# Patient Record
Sex: FEMALE | Race: WHITE | NOT HISPANIC OR LATINO | Employment: FULL TIME | URBAN - METROPOLITAN AREA
[De-identification: names, ages, dates, MRNs, and addresses within clinical notes are randomized per-mention and may not be internally consistent; named-entity substitution may affect disease eponyms.]

---

## 2017-01-13 ENCOUNTER — PRENATAL OFFICE VISIT - HEALTHEAST (OUTPATIENT)
Dept: FAMILY MEDICINE | Facility: CLINIC | Age: 32
End: 2017-01-13

## 2017-01-13 DIAGNOSIS — F17.200 TOBACCO USE DISORDER: ICD-10-CM

## 2017-01-13 DIAGNOSIS — Z34.82 ENCOUNTER FOR SUPERVISION OF OTHER NORMAL PREGNANCY IN SECOND TRIMESTER: ICD-10-CM

## 2017-02-08 ENCOUNTER — HOSPITAL ENCOUNTER (OUTPATIENT)
Dept: ULTRASOUND IMAGING | Facility: CLINIC | Age: 32
Discharge: HOME | End: 2017-02-08
Attending: FAMILY MEDICINE

## 2017-02-08 ENCOUNTER — PRENATAL OFFICE VISIT - HEALTHEAST (OUTPATIENT)
Dept: FAMILY MEDICINE | Facility: CLINIC | Age: 32
End: 2017-02-08

## 2017-02-08 DIAGNOSIS — Z34.82 ENCOUNTER FOR SUPERVISION OF OTHER NORMAL PREGNANCY IN SECOND TRIMESTER: ICD-10-CM

## 2017-02-08 DIAGNOSIS — I83.90 VARICOSE VEIN OF LEG: ICD-10-CM

## 2017-03-08 ENCOUNTER — PRENATAL OFFICE VISIT - HEALTHEAST (OUTPATIENT)
Dept: FAMILY MEDICINE | Facility: CLINIC | Age: 32
End: 2017-03-08

## 2017-03-08 DIAGNOSIS — Z34.82 ENCOUNTER FOR SUPERVISION OF OTHER NORMAL PREGNANCY IN SECOND TRIMESTER: ICD-10-CM

## 2017-03-16 ENCOUNTER — COMMUNICATION - HEALTHEAST (OUTPATIENT)
Dept: FAMILY MEDICINE | Facility: CLINIC | Age: 32
End: 2017-03-16

## 2017-03-22 ENCOUNTER — COMMUNICATION - HEALTHEAST (OUTPATIENT)
Dept: FAMILY MEDICINE | Facility: CLINIC | Age: 32
End: 2017-03-22

## 2017-03-24 ENCOUNTER — COMMUNICATION - HEALTHEAST (OUTPATIENT)
Dept: SCHEDULING | Facility: CLINIC | Age: 32
End: 2017-03-24

## 2017-03-24 ENCOUNTER — HOSPITAL ENCOUNTER (OUTPATIENT)
Dept: MEDSURG UNIT | Facility: CLINIC | Age: 32
Discharge: HOME | End: 2017-03-24
Attending: FAMILY MEDICINE | Admitting: FAMILY MEDICINE

## 2017-03-24 ASSESSMENT — MIFFLIN-ST. JEOR: SCORE: 1467.92

## 2017-04-03 ENCOUNTER — COMMUNICATION - HEALTHEAST (OUTPATIENT)
Dept: FAMILY MEDICINE | Facility: CLINIC | Age: 32
End: 2017-04-03

## 2017-04-07 ENCOUNTER — PRENATAL OFFICE VISIT - HEALTHEAST (OUTPATIENT)
Dept: FAMILY MEDICINE | Facility: CLINIC | Age: 32
End: 2017-04-07

## 2017-04-07 DIAGNOSIS — Z34.90 SUPERVISION OF NORMAL PREGNANCY: ICD-10-CM

## 2017-04-07 DIAGNOSIS — M25.532 LEFT WRIST PAIN: ICD-10-CM

## 2017-04-10 LAB — SYPHILIS RPR SCREEN - HISTORICAL: NORMAL

## 2017-04-11 ENCOUNTER — COMMUNICATION - HEALTHEAST (OUTPATIENT)
Dept: FAMILY MEDICINE | Facility: CLINIC | Age: 32
End: 2017-04-11

## 2017-04-19 ENCOUNTER — PRENATAL OFFICE VISIT - HEALTHEAST (OUTPATIENT)
Dept: FAMILY MEDICINE | Facility: CLINIC | Age: 32
End: 2017-04-19

## 2017-04-19 DIAGNOSIS — Z34.82 ENCOUNTER FOR SUPERVISION OF OTHER NORMAL PREGNANCY IN SECOND TRIMESTER: ICD-10-CM

## 2017-04-19 DIAGNOSIS — R05.9 COUGH: ICD-10-CM

## 2017-05-04 ENCOUNTER — PRENATAL OFFICE VISIT - HEALTHEAST (OUTPATIENT)
Dept: FAMILY MEDICINE | Facility: CLINIC | Age: 32
End: 2017-05-04

## 2017-05-04 DIAGNOSIS — Z34.82 ENCOUNTER FOR SUPERVISION OF OTHER NORMAL PREGNANCY IN SECOND TRIMESTER: ICD-10-CM

## 2017-05-18 ENCOUNTER — PRENATAL OFFICE VISIT - HEALTHEAST (OUTPATIENT)
Dept: FAMILY MEDICINE | Facility: CLINIC | Age: 32
End: 2017-05-18

## 2017-05-18 DIAGNOSIS — J30.2 SEASONAL ALLERGIES: ICD-10-CM

## 2017-05-18 DIAGNOSIS — Z34.82 ENCOUNTER FOR SUPERVISION OF OTHER NORMAL PREGNANCY IN SECOND TRIMESTER: ICD-10-CM

## 2017-05-19 ENCOUNTER — HOSPITAL ENCOUNTER (OUTPATIENT)
Dept: ULTRASOUND IMAGING | Facility: CLINIC | Age: 32
Discharge: HOME | End: 2017-05-19
Attending: FAMILY MEDICINE

## 2017-05-19 DIAGNOSIS — Z34.82 ENCOUNTER FOR SUPERVISION OF OTHER NORMAL PREGNANCY IN SECOND TRIMESTER: ICD-10-CM

## 2017-05-31 ENCOUNTER — COMMUNICATION - HEALTHEAST (OUTPATIENT)
Dept: SCHEDULING | Facility: CLINIC | Age: 32
End: 2017-05-31

## 2017-05-31 ENCOUNTER — HOSPITAL ENCOUNTER (OUTPATIENT)
Dept: MEDSURG UNIT | Facility: CLINIC | Age: 32
Discharge: HOME | End: 2017-05-31
Attending: FAMILY MEDICINE | Admitting: FAMILY MEDICINE

## 2017-05-31 ASSESSMENT — MIFFLIN-ST. JEOR: SCORE: 1508.74

## 2017-06-01 ENCOUNTER — PRENATAL OFFICE VISIT - HEALTHEAST (OUTPATIENT)
Dept: FAMILY MEDICINE | Facility: CLINIC | Age: 32
End: 2017-06-01

## 2017-06-01 DIAGNOSIS — Z34.90 SUPERVISION OF NORMAL PREGNANCY: ICD-10-CM

## 2017-06-09 ENCOUNTER — PRENATAL OFFICE VISIT - HEALTHEAST (OUTPATIENT)
Dept: FAMILY MEDICINE | Facility: CLINIC | Age: 32
End: 2017-06-09

## 2017-06-09 DIAGNOSIS — Z34.90 SUPERVISION OF NORMAL PREGNANCY: ICD-10-CM

## 2017-06-13 ENCOUNTER — COMMUNICATION - HEALTHEAST (OUTPATIENT)
Dept: FAMILY MEDICINE | Facility: CLINIC | Age: 32
End: 2017-06-13

## 2017-06-15 ENCOUNTER — HOSPITAL ENCOUNTER (OUTPATIENT)
Dept: ULTRASOUND IMAGING | Facility: CLINIC | Age: 32
Discharge: HOME | End: 2017-06-15
Attending: FAMILY MEDICINE

## 2017-06-15 ENCOUNTER — PRENATAL OFFICE VISIT - HEALTHEAST (OUTPATIENT)
Dept: FAMILY MEDICINE | Facility: CLINIC | Age: 32
End: 2017-06-15

## 2017-06-15 ENCOUNTER — COMMUNICATION - HEALTHEAST (OUTPATIENT)
Dept: FAMILY MEDICINE | Facility: CLINIC | Age: 32
End: 2017-06-15

## 2017-06-15 DIAGNOSIS — Z34.82 ENCOUNTER FOR SUPERVISION OF OTHER NORMAL PREGNANCY IN SECOND TRIMESTER: ICD-10-CM

## 2017-06-16 ENCOUNTER — COMMUNICATION - HEALTHEAST (OUTPATIENT)
Dept: FAMILY MEDICINE | Facility: CLINIC | Age: 32
End: 2017-06-16

## 2017-06-22 ENCOUNTER — PRENATAL OFFICE VISIT - HEALTHEAST (OUTPATIENT)
Dept: FAMILY MEDICINE | Facility: CLINIC | Age: 32
End: 2017-06-22

## 2017-06-22 DIAGNOSIS — Z34.82 ENCOUNTER FOR SUPERVISION OF OTHER NORMAL PREGNANCY IN SECOND TRIMESTER: ICD-10-CM

## 2017-06-29 ENCOUNTER — ANESTHESIA - HEALTHEAST (OUTPATIENT)
Dept: OBGYN | Facility: CLINIC | Age: 32
End: 2017-06-29

## 2017-06-30 ENCOUNTER — HOME CARE/HOSPICE - HEALTHEAST (OUTPATIENT)
Dept: HOME HEALTH SERVICES | Facility: HOME HEALTH | Age: 32
End: 2017-06-30

## 2017-07-03 ENCOUNTER — COMMUNICATION - HEALTHEAST (OUTPATIENT)
Dept: OBGYN | Facility: CLINIC | Age: 32
End: 2017-07-03

## 2017-07-07 ENCOUNTER — COMMUNICATION - HEALTHEAST (OUTPATIENT)
Dept: FAMILY MEDICINE | Facility: CLINIC | Age: 32
End: 2017-07-07

## 2017-08-28 ENCOUNTER — COMMUNICATION - HEALTHEAST (OUTPATIENT)
Dept: FAMILY MEDICINE | Facility: CLINIC | Age: 32
End: 2017-08-28

## 2017-09-17 ENCOUNTER — OFFICE VISIT - HEALTHEAST (OUTPATIENT)
Dept: FAMILY MEDICINE | Facility: CLINIC | Age: 32
End: 2017-09-17

## 2017-09-17 DIAGNOSIS — M79.671 RIGHT FOOT PAIN: ICD-10-CM

## 2017-10-13 ENCOUNTER — OFFICE VISIT - HEALTHEAST (OUTPATIENT)
Dept: FAMILY MEDICINE | Facility: CLINIC | Age: 32
End: 2017-10-13

## 2017-10-13 DIAGNOSIS — Z23 NEED FOR VACCINATION: ICD-10-CM

## 2017-10-13 DIAGNOSIS — J45.909 ASTHMA: ICD-10-CM

## 2017-10-13 DIAGNOSIS — M54.50 LOW BACK PAIN: ICD-10-CM

## 2017-10-13 DIAGNOSIS — Z30.9 CONTRACEPTION MANAGEMENT: ICD-10-CM

## 2017-10-13 ASSESSMENT — MIFFLIN-ST. JEOR: SCORE: 1421.42

## 2017-10-29 ENCOUNTER — OFFICE VISIT - HEALTHEAST (OUTPATIENT)
Dept: FAMILY MEDICINE | Facility: CLINIC | Age: 32
End: 2017-10-29

## 2017-10-29 DIAGNOSIS — S29.012A MUSCLE STRAIN OF RIGHT UPPER BACK, INITIAL ENCOUNTER: ICD-10-CM

## 2017-11-14 ENCOUNTER — HOSPITAL ENCOUNTER (OUTPATIENT)
Dept: MRI IMAGING | Facility: CLINIC | Age: 32
Discharge: HOME | End: 2017-11-14
Attending: FAMILY MEDICINE

## 2017-11-14 DIAGNOSIS — M54.50 LOW BACK PAIN: ICD-10-CM

## 2017-11-16 ENCOUNTER — COMMUNICATION - HEALTHEAST (OUTPATIENT)
Dept: FAMILY MEDICINE | Facility: CLINIC | Age: 32
End: 2017-11-16

## 2017-11-24 ENCOUNTER — HOSPITAL ENCOUNTER (OUTPATIENT)
Dept: PHYSICAL MEDICINE AND REHAB | Facility: CLINIC | Age: 32
Discharge: HOME | End: 2017-11-24
Attending: PHYSICIAN ASSISTANT

## 2017-11-24 DIAGNOSIS — M54.16 LUMBAR RADICULITIS: ICD-10-CM

## 2017-11-24 DIAGNOSIS — M51.26 LUMBAR DISC HERNIATION: ICD-10-CM

## 2017-12-08 ENCOUNTER — OFFICE VISIT - HEALTHEAST (OUTPATIENT)
Dept: NEUROSURGERY | Facility: CLINIC | Age: 32
End: 2017-12-08

## 2017-12-08 DIAGNOSIS — M51.16 LUMBAR DISC HERNIATION WITH RADICULOPATHY: ICD-10-CM

## 2017-12-08 DIAGNOSIS — M54.16 LUMBAR RADICULOPATHY: ICD-10-CM

## 2017-12-08 ASSESSMENT — MIFFLIN-ST. JEOR: SCORE: 1430.49

## 2017-12-12 ENCOUNTER — COMMUNICATION - HEALTHEAST (OUTPATIENT)
Dept: NEUROSURGERY | Facility: CLINIC | Age: 32
End: 2017-12-12

## 2017-12-13 ENCOUNTER — RECORDS - HEALTHEAST (OUTPATIENT)
Dept: ADMINISTRATIVE | Facility: OTHER | Age: 32
End: 2017-12-13

## 2018-04-15 ENCOUNTER — RECORDS - HEALTHEAST (OUTPATIENT)
Dept: ADMINISTRATIVE | Facility: OTHER | Age: 33
End: 2018-04-15

## 2018-04-26 ENCOUNTER — COMMUNICATION - HEALTHEAST (OUTPATIENT)
Dept: FAMILY MEDICINE | Facility: CLINIC | Age: 33
End: 2018-04-26

## 2018-04-30 ENCOUNTER — COMMUNICATION - HEALTHEAST (OUTPATIENT)
Dept: FAMILY MEDICINE | Facility: CLINIC | Age: 33
End: 2018-04-30

## 2018-05-24 ENCOUNTER — COMMUNICATION - HEALTHEAST (OUTPATIENT)
Dept: TELEHEALTH | Facility: CLINIC | Age: 33
End: 2018-05-24

## 2018-05-24 ENCOUNTER — COMMUNICATION - HEALTHEAST (OUTPATIENT)
Dept: HEALTH INFORMATION MANAGEMENT | Facility: CLINIC | Age: 33
End: 2018-05-24

## 2018-08-08 ENCOUNTER — COMMUNICATION - HEALTHEAST (OUTPATIENT)
Dept: FAMILY MEDICINE | Facility: CLINIC | Age: 33
End: 2018-08-08

## 2018-08-08 ENCOUNTER — RECORDS - HEALTHEAST (OUTPATIENT)
Dept: GENERAL RADIOLOGY | Facility: CLINIC | Age: 33
End: 2018-08-08

## 2018-08-08 ENCOUNTER — OFFICE VISIT - HEALTHEAST (OUTPATIENT)
Dept: FAMILY MEDICINE | Facility: CLINIC | Age: 33
End: 2018-08-08

## 2018-08-08 DIAGNOSIS — M25.531 RIGHT WRIST PAIN: ICD-10-CM

## 2018-08-08 DIAGNOSIS — M25.531 WRIST PAIN, RIGHT: ICD-10-CM

## 2018-08-08 DIAGNOSIS — M25.531 PAIN IN RIGHT WRIST: ICD-10-CM

## 2018-08-08 DIAGNOSIS — M54.6 RIGHT-SIDED THORACIC BACK PAIN: ICD-10-CM

## 2018-08-09 ENCOUNTER — HOSPITAL ENCOUNTER (OUTPATIENT)
Dept: MRI IMAGING | Facility: CLINIC | Age: 33
Discharge: HOME | End: 2018-08-09
Attending: FAMILY MEDICINE

## 2018-08-09 ENCOUNTER — COMMUNICATION - HEALTHEAST (OUTPATIENT)
Dept: FAMILY MEDICINE | Facility: CLINIC | Age: 33
End: 2018-08-09

## 2018-08-09 ENCOUNTER — RECORDS - HEALTHEAST (OUTPATIENT)
Dept: ADMINISTRATIVE | Facility: OTHER | Age: 33
End: 2018-08-09

## 2018-08-09 DIAGNOSIS — M54.6 RIGHT-SIDED THORACIC BACK PAIN: ICD-10-CM

## 2018-08-15 ENCOUNTER — RECORDS - HEALTHEAST (OUTPATIENT)
Dept: ADMINISTRATIVE | Facility: OTHER | Age: 33
End: 2018-08-15

## 2018-09-26 ENCOUNTER — COMMUNICATION - HEALTHEAST (OUTPATIENT)
Dept: FAMILY MEDICINE | Facility: CLINIC | Age: 33
End: 2018-09-26

## 2018-11-13 ENCOUNTER — OFFICE VISIT - HEALTHEAST (OUTPATIENT)
Dept: FAMILY MEDICINE | Facility: CLINIC | Age: 33
End: 2018-11-13

## 2018-11-13 ENCOUNTER — COMMUNICATION - HEALTHEAST (OUTPATIENT)
Dept: NEUROSURGERY | Facility: CLINIC | Age: 33
End: 2018-11-13

## 2018-11-13 DIAGNOSIS — R32 URINARY INCONTINENCE, UNSPECIFIED TYPE: ICD-10-CM

## 2018-11-13 DIAGNOSIS — M54.42 ACUTE LEFT-SIDED LOW BACK PAIN WITH LEFT-SIDED SCIATICA: ICD-10-CM

## 2018-11-14 ENCOUNTER — OFFICE VISIT - HEALTHEAST (OUTPATIENT)
Dept: NEUROSURGERY | Facility: CLINIC | Age: 33
End: 2018-11-14

## 2018-11-14 ENCOUNTER — HOSPITAL ENCOUNTER (OUTPATIENT)
Dept: RADIOLOGY | Facility: HOSPITAL | Age: 33
Discharge: HOME | End: 2018-11-14

## 2018-11-14 DIAGNOSIS — M54.16 LUMBAR RADICULOPATHY: ICD-10-CM

## 2018-11-14 DIAGNOSIS — M54.40 ACUTE BILATERAL LOW BACK PAIN WITH SCIATICA, SCIATICA LATERALITY UNSPECIFIED: ICD-10-CM

## 2018-11-14 ASSESSMENT — MIFFLIN-ST. JEOR: SCORE: 1413.94

## 2018-11-17 ENCOUNTER — COMMUNICATION - HEALTHEAST (OUTPATIENT)
Dept: FAMILY MEDICINE | Facility: CLINIC | Age: 33
End: 2018-11-17

## 2018-11-17 DIAGNOSIS — J45.909 ASTHMA: ICD-10-CM

## 2019-05-24 ENCOUNTER — RECORDS - HEALTHEAST (OUTPATIENT)
Dept: GENERAL RADIOLOGY | Facility: CLINIC | Age: 34
End: 2019-05-24

## 2019-05-24 ENCOUNTER — RECORDS - HEALTHEAST (OUTPATIENT)
Dept: ADMINISTRATIVE | Facility: OTHER | Age: 34
End: 2019-05-24

## 2019-05-24 ENCOUNTER — OFFICE VISIT - HEALTHEAST (OUTPATIENT)
Dept: FAMILY MEDICINE | Facility: CLINIC | Age: 34
End: 2019-05-24

## 2019-05-24 DIAGNOSIS — M79.671 RIGHT FOOT PAIN: ICD-10-CM

## 2019-05-24 DIAGNOSIS — E55.9 VITAMIN D DEFICIENCY: ICD-10-CM

## 2019-05-24 DIAGNOSIS — J45.909 ASTHMA: ICD-10-CM

## 2019-05-24 DIAGNOSIS — Z13.220 LIPID SCREENING: ICD-10-CM

## 2019-05-24 DIAGNOSIS — J30.2 SEASONAL ALLERGIES: ICD-10-CM

## 2019-05-24 DIAGNOSIS — M79.671 PAIN IN RIGHT FOOT: ICD-10-CM

## 2019-05-24 LAB
ALBUMIN SERPL-MCNC: 4.2 G/DL (ref 3.5–5)
ALP SERPL-CCNC: 47 U/L (ref 45–120)
ALT SERPL W P-5'-P-CCNC: 13 U/L (ref 0–45)
ANION GAP SERPL CALCULATED.3IONS-SCNC: 9 MMOL/L (ref 5–18)
AST SERPL W P-5'-P-CCNC: 16 U/L (ref 0–40)
BILIRUB SERPL-MCNC: 0.7 MG/DL (ref 0–1)
BUN SERPL-MCNC: 18 MG/DL (ref 8–22)
CALCIUM SERPL-MCNC: 10.3 MG/DL (ref 8.5–10.5)
CHLORIDE BLD-SCNC: 106 MMOL/L (ref 98–107)
CHOLEST SERPL-MCNC: 148 MG/DL
CO2 SERPL-SCNC: 25 MMOL/L (ref 22–31)
CREAT SERPL-MCNC: 0.83 MG/DL (ref 0.6–1.1)
FASTING STATUS PATIENT QL REPORTED: YES
GFR SERPL CREATININE-BSD FRML MDRD: >60 ML/MIN/1.73M2
GLUCOSE BLD-MCNC: 85 MG/DL (ref 70–125)
HDLC SERPL-MCNC: 72 MG/DL
LDLC SERPL CALC-MCNC: 67 MG/DL
POTASSIUM BLD-SCNC: 4.3 MMOL/L (ref 3.5–5)
PROT SERPL-MCNC: 7.1 G/DL (ref 6–8)
SODIUM SERPL-SCNC: 140 MMOL/L (ref 136–145)
TRIGL SERPL-MCNC: 44 MG/DL

## 2019-05-27 LAB
25(OH)D3 SERPL-MCNC: 15.4 NG/ML (ref 30–80)
25(OH)D3 SERPL-MCNC: 15.4 NG/ML (ref 30–80)

## 2019-05-28 ENCOUNTER — COMMUNICATION - HEALTHEAST (OUTPATIENT)
Dept: FAMILY MEDICINE | Facility: CLINIC | Age: 34
End: 2019-05-28

## 2019-05-29 ENCOUNTER — COMMUNICATION - HEALTHEAST (OUTPATIENT)
Dept: FAMILY MEDICINE | Facility: CLINIC | Age: 34
End: 2019-05-29

## 2019-05-29 DIAGNOSIS — J45.909 ASTHMA: ICD-10-CM

## 2019-06-21 ENCOUNTER — OFFICE VISIT - HEALTHEAST (OUTPATIENT)
Dept: FAMILY MEDICINE | Facility: CLINIC | Age: 34
End: 2019-06-21

## 2019-06-21 DIAGNOSIS — R10.11 RUQ ABDOMINAL PAIN: ICD-10-CM

## 2019-06-21 LAB
ALBUMIN SERPL-MCNC: 4.4 G/DL (ref 3.5–5)
ALP SERPL-CCNC: 48 U/L (ref 45–120)
ALT SERPL W P-5'-P-CCNC: 14 U/L (ref 0–45)
ANION GAP SERPL CALCULATED.3IONS-SCNC: 5 MMOL/L (ref 5–18)
AST SERPL W P-5'-P-CCNC: 16 U/L (ref 0–40)
BASOPHILS # BLD AUTO: 0 THOU/UL (ref 0–0.2)
BASOPHILS NFR BLD AUTO: 1 % (ref 0–2)
BILIRUB SERPL-MCNC: 0.6 MG/DL (ref 0–1)
BUN SERPL-MCNC: 19 MG/DL (ref 8–22)
CALCIUM SERPL-MCNC: 9.6 MG/DL (ref 8.5–10.5)
CHLORIDE BLD-SCNC: 106 MMOL/L (ref 98–107)
CO2 SERPL-SCNC: 30 MMOL/L (ref 22–31)
CREAT SERPL-MCNC: 0.87 MG/DL (ref 0.6–1.1)
EOSINOPHIL # BLD AUTO: 0.7 THOU/UL (ref 0–0.4)
EOSINOPHIL NFR BLD AUTO: 8 % (ref 0–6)
ERYTHROCYTE [DISTWIDTH] IN BLOOD BY AUTOMATED COUNT: 11.7 % (ref 11–14.5)
GFR SERPL CREATININE-BSD FRML MDRD: >60 ML/MIN/1.73M2
GLUCOSE BLD-MCNC: 78 MG/DL (ref 70–125)
HCG UR QL: NEGATIVE
HCT VFR BLD AUTO: 38.1 % (ref 35–47)
HGB BLD-MCNC: 12.9 G/DL (ref 12–16)
LIPASE SERPL-CCNC: 32 U/L (ref 0–52)
LYMPHOCYTES # BLD AUTO: 2.6 THOU/UL (ref 0.8–4.4)
LYMPHOCYTES NFR BLD AUTO: 29 % (ref 20–40)
MCH RBC QN AUTO: 30.5 PG (ref 27–34)
MCHC RBC AUTO-ENTMCNC: 33.8 G/DL (ref 32–36)
MCV RBC AUTO: 90 FL (ref 80–100)
MONOCYTES # BLD AUTO: 0.5 THOU/UL (ref 0–0.9)
MONOCYTES NFR BLD AUTO: 5 % (ref 2–10)
NEUTROPHILS # BLD AUTO: 5.2 THOU/UL (ref 2–7.7)
NEUTROPHILS NFR BLD AUTO: 57 % (ref 50–70)
PLATELET # BLD AUTO: 158 THOU/UL (ref 140–440)
PMV BLD AUTO: 7.8 FL (ref 7–10)
POTASSIUM BLD-SCNC: 4 MMOL/L (ref 3.5–5)
PROT SERPL-MCNC: 7.1 G/DL (ref 6–8)
RBC # BLD AUTO: 4.22 MILL/UL (ref 3.8–5.4)
SODIUM SERPL-SCNC: 141 MMOL/L (ref 136–145)
WBC: 9 THOU/UL (ref 4–11)

## 2019-10-29 ENCOUNTER — OFFICE VISIT - HEALTHEAST (OUTPATIENT)
Dept: FAMILY MEDICINE | Facility: CLINIC | Age: 34
End: 2019-10-29

## 2019-10-29 DIAGNOSIS — Z13.220 LIPID SCREENING: ICD-10-CM

## 2019-10-29 DIAGNOSIS — L29.9 PRURITUS: ICD-10-CM

## 2019-10-29 DIAGNOSIS — E55.9 VITAMIN D DEFICIENCY: ICD-10-CM

## 2019-10-29 DIAGNOSIS — Z00.00 ROUTINE GENERAL MEDICAL EXAMINATION AT A HEALTH CARE FACILITY: ICD-10-CM

## 2019-10-29 DIAGNOSIS — G89.29 CHRONIC NONINTRACTABLE HEADACHE, UNSPECIFIED HEADACHE TYPE: ICD-10-CM

## 2019-10-29 DIAGNOSIS — Z23 IMMUNIZATION DUE: ICD-10-CM

## 2019-10-29 DIAGNOSIS — R51.9 CHRONIC NONINTRACTABLE HEADACHE, UNSPECIFIED HEADACHE TYPE: ICD-10-CM

## 2019-10-29 DIAGNOSIS — R10.11 RUQ ABDOMINAL PAIN: ICD-10-CM

## 2019-10-29 LAB
ALBUMIN SERPL-MCNC: 4.4 G/DL (ref 3.5–5)
ALP SERPL-CCNC: 56 U/L (ref 45–120)
ALT SERPL W P-5'-P-CCNC: 11 U/L (ref 0–45)
ANION GAP SERPL CALCULATED.3IONS-SCNC: 8 MMOL/L (ref 5–18)
AST SERPL W P-5'-P-CCNC: 17 U/L (ref 0–40)
BASOPHILS # BLD AUTO: 0 THOU/UL (ref 0–0.2)
BASOPHILS NFR BLD AUTO: 1 % (ref 0–2)
BILIRUB SERPL-MCNC: 0.7 MG/DL (ref 0–1)
BUN SERPL-MCNC: 20 MG/DL (ref 8–22)
CALCIUM SERPL-MCNC: 10.1 MG/DL (ref 8.5–10.5)
CHLORIDE BLD-SCNC: 104 MMOL/L (ref 98–107)
CHOLEST SERPL-MCNC: 161 MG/DL
CO2 SERPL-SCNC: 27 MMOL/L (ref 22–31)
CREAT SERPL-MCNC: 0.84 MG/DL (ref 0.6–1.1)
EOSINOPHIL # BLD AUTO: 0.4 THOU/UL (ref 0–0.4)
EOSINOPHIL NFR BLD AUTO: 5 % (ref 0–6)
ERYTHROCYTE [DISTWIDTH] IN BLOOD BY AUTOMATED COUNT: 13.5 % (ref 11–14.5)
ERYTHROCYTE [SEDIMENTATION RATE] IN BLOOD BY WESTERGREN METHOD: 3 MM/HR (ref 0–20)
FASTING STATUS PATIENT QL REPORTED: NORMAL
GFR SERPL CREATININE-BSD FRML MDRD: >60 ML/MIN/1.73M2
GLUCOSE BLD-MCNC: 88 MG/DL (ref 70–125)
HBA1C MFR BLD: 5 % (ref 3.5–6)
HCT VFR BLD AUTO: 39.5 % (ref 35–47)
HDLC SERPL-MCNC: 83 MG/DL
HGB BLD-MCNC: 13.4 G/DL (ref 12–16)
LDLC SERPL CALC-MCNC: 68 MG/DL
LYMPHOCYTES # BLD AUTO: 2.2 THOU/UL (ref 0.8–4.4)
LYMPHOCYTES NFR BLD AUTO: 24 % (ref 20–40)
MCH RBC QN AUTO: 30.3 PG (ref 27–34)
MCHC RBC AUTO-ENTMCNC: 34 G/DL (ref 32–36)
MCV RBC AUTO: 89 FL (ref 80–100)
MONOCYTES # BLD AUTO: 0.5 THOU/UL (ref 0–0.9)
MONOCYTES NFR BLD AUTO: 5 % (ref 2–10)
NEUTROPHILS # BLD AUTO: 5.7 THOU/UL (ref 2–7.7)
NEUTROPHILS NFR BLD AUTO: 65 % (ref 50–70)
PLATELET # BLD AUTO: 178 THOU/UL (ref 140–440)
PMV BLD AUTO: 7.9 FL (ref 7–10)
POTASSIUM BLD-SCNC: 4.6 MMOL/L (ref 3.5–5)
PROT SERPL-MCNC: 7.4 G/DL (ref 6–8)
RBC # BLD AUTO: 4.43 MILL/UL (ref 3.8–5.4)
SODIUM SERPL-SCNC: 139 MMOL/L (ref 136–145)
TRIGL SERPL-MCNC: 51 MG/DL
TSH SERPL DL<=0.005 MIU/L-ACNC: 0.68 UIU/ML (ref 0.3–5)
WBC: 8.8 THOU/UL (ref 4–11)

## 2019-10-29 RX ORDER — TRIAMCINOLONE ACETONIDE 1 MG/G
OINTMENT TOPICAL
Qty: 30 G | Refills: 2 | Status: SHIPPED | OUTPATIENT
Start: 2019-10-29 | End: 2022-03-02

## 2019-10-29 ASSESSMENT — MIFFLIN-ST. JEOR: SCORE: 1441.61

## 2019-10-30 ENCOUNTER — COMMUNICATION - HEALTHEAST (OUTPATIENT)
Dept: FAMILY MEDICINE | Facility: CLINIC | Age: 34
End: 2019-10-30

## 2019-10-30 LAB
25(OH)D3 SERPL-MCNC: 42 NG/ML (ref 30–80)
25(OH)D3 SERPL-MCNC: 42 NG/ML (ref 30–80)

## 2019-11-05 ENCOUNTER — HOSPITAL ENCOUNTER (OUTPATIENT)
Dept: ULTRASOUND IMAGING | Facility: CLINIC | Age: 34
Discharge: HOME | End: 2019-11-05
Attending: FAMILY MEDICINE

## 2019-11-05 DIAGNOSIS — R10.11 RUQ ABDOMINAL PAIN: ICD-10-CM

## 2019-11-06 ENCOUNTER — COMMUNICATION - HEALTHEAST (OUTPATIENT)
Dept: FAMILY MEDICINE | Facility: CLINIC | Age: 34
End: 2019-11-06

## 2019-11-06 DIAGNOSIS — R10.11 RUQ ABDOMINAL PAIN: ICD-10-CM

## 2019-11-07 RX ORDER — OMEPRAZOLE 40 MG/1
40 CAPSULE, DELAYED RELEASE ORAL DAILY
Qty: 30 CAPSULE | Refills: 1 | Status: SHIPPED | OUTPATIENT
Start: 2019-11-07 | End: 2022-03-02

## 2020-01-02 ENCOUNTER — COMMUNICATION - HEALTHEAST (OUTPATIENT)
Dept: FAMILY MEDICINE | Facility: CLINIC | Age: 35
End: 2020-01-02

## 2020-01-02 DIAGNOSIS — J45.909 ASTHMA: ICD-10-CM

## 2020-01-06 ENCOUNTER — COMMUNICATION - HEALTHEAST (OUTPATIENT)
Dept: FAMILY MEDICINE | Facility: CLINIC | Age: 35
End: 2020-01-06

## 2020-01-06 DIAGNOSIS — G89.29 CHRONIC NONINTRACTABLE HEADACHE, UNSPECIFIED HEADACHE TYPE: ICD-10-CM

## 2020-01-06 DIAGNOSIS — R51.9 CHRONIC NONINTRACTABLE HEADACHE, UNSPECIFIED HEADACHE TYPE: ICD-10-CM

## 2020-01-07 ENCOUNTER — HOSPITAL ENCOUNTER (OUTPATIENT)
Dept: CT IMAGING | Facility: CLINIC | Age: 35
Discharge: HOME | End: 2020-01-07
Attending: FAMILY MEDICINE

## 2020-01-07 DIAGNOSIS — R51.9 CHRONIC NONINTRACTABLE HEADACHE, UNSPECIFIED HEADACHE TYPE: ICD-10-CM

## 2020-01-07 DIAGNOSIS — G89.29 CHRONIC NONINTRACTABLE HEADACHE, UNSPECIFIED HEADACHE TYPE: ICD-10-CM

## 2020-01-22 ENCOUNTER — RECORDS - HEALTHEAST (OUTPATIENT)
Dept: ADMINISTRATIVE | Facility: OTHER | Age: 35
End: 2020-01-22

## 2020-03-23 ENCOUNTER — COMMUNICATION - HEALTHEAST (OUTPATIENT)
Dept: FAMILY MEDICINE | Facility: CLINIC | Age: 35
End: 2020-03-23

## 2020-03-24 ENCOUNTER — OFFICE VISIT - HEALTHEAST (OUTPATIENT)
Dept: FAMILY MEDICINE | Facility: CLINIC | Age: 35
End: 2020-03-24

## 2020-03-24 ENCOUNTER — COMMUNICATION - HEALTHEAST (OUTPATIENT)
Dept: FAMILY MEDICINE | Facility: CLINIC | Age: 35
End: 2020-03-24

## 2020-03-24 DIAGNOSIS — J45.31 MILD PERSISTENT ASTHMA WITH ACUTE EXACERBATION: ICD-10-CM

## 2020-03-24 DIAGNOSIS — J45.909 ASTHMA: ICD-10-CM

## 2020-03-24 DIAGNOSIS — B37.0 THRUSH: ICD-10-CM

## 2020-03-24 RX ORDER — ALBUTEROL SULFATE 0.83 MG/ML
2.5 SOLUTION RESPIRATORY (INHALATION) EVERY 4 HOURS PRN
Qty: 30 VIAL | Refills: 0 | Status: SHIPPED | OUTPATIENT
Start: 2020-03-24 | End: 2022-03-02

## 2020-04-28 ENCOUNTER — COMMUNICATION - HEALTHEAST (OUTPATIENT)
Dept: FAMILY MEDICINE | Facility: CLINIC | Age: 35
End: 2020-04-28

## 2020-04-28 DIAGNOSIS — J45.31 MILD PERSISTENT ASTHMA WITH ACUTE EXACERBATION: ICD-10-CM

## 2020-08-10 ENCOUNTER — COMMUNICATION - HEALTHEAST (OUTPATIENT)
Dept: FAMILY MEDICINE | Facility: CLINIC | Age: 35
End: 2020-08-10

## 2020-08-10 DIAGNOSIS — J45.909 ASTHMA: ICD-10-CM

## 2020-10-13 ENCOUNTER — COMMUNICATION - HEALTHEAST (OUTPATIENT)
Dept: FAMILY MEDICINE | Facility: CLINIC | Age: 35
End: 2020-10-13

## 2020-10-13 DIAGNOSIS — J45.909 ASTHMA: ICD-10-CM

## 2020-10-17 RX ORDER — ALBUTEROL SULFATE 90 UG/1
AEROSOL, METERED RESPIRATORY (INHALATION)
Qty: 34 G | Refills: 2 | Status: SHIPPED | OUTPATIENT
Start: 2020-10-17 | End: 2021-11-11

## 2020-11-21 ENCOUNTER — OFFICE VISIT - HEALTHEAST (OUTPATIENT)
Dept: FAMILY MEDICINE | Facility: CLINIC | Age: 35
End: 2020-11-21

## 2020-11-21 ENCOUNTER — RECORDS - HEALTHEAST (OUTPATIENT)
Dept: ADMINISTRATIVE | Facility: OTHER | Age: 35
End: 2020-11-21

## 2020-11-21 DIAGNOSIS — M77.11 LATERAL EPICONDYLITIS OF RIGHT ELBOW: ICD-10-CM

## 2020-11-21 DIAGNOSIS — M25.512 ACUTE PAIN OF LEFT SHOULDER: ICD-10-CM

## 2020-12-26 ENCOUNTER — OFFICE VISIT - HEALTHEAST (OUTPATIENT)
Dept: FAMILY MEDICINE | Facility: CLINIC | Age: 35
End: 2020-12-26

## 2020-12-26 DIAGNOSIS — L03.012 PARONYCHIA OF LEFT LITTLE FINGER: ICD-10-CM

## 2020-12-26 ASSESSMENT — MIFFLIN-ST. JEOR: SCORE: 1483.34

## 2021-03-19 ENCOUNTER — RECORDS - HEALTHEAST (OUTPATIENT)
Dept: ADMINISTRATIVE | Facility: OTHER | Age: 36
End: 2021-03-19

## 2021-04-30 ENCOUNTER — AMBULATORY - HEALTHEAST (OUTPATIENT)
Dept: NURSING | Facility: CLINIC | Age: 36
End: 2021-04-30

## 2021-05-21 ENCOUNTER — AMBULATORY - HEALTHEAST (OUTPATIENT)
Dept: NURSING | Facility: CLINIC | Age: 36
End: 2021-05-21

## 2021-05-26 ENCOUNTER — RECORDS - HEALTHEAST (OUTPATIENT)
Dept: ADMINISTRATIVE | Facility: CLINIC | Age: 36
End: 2021-05-26

## 2021-05-27 ENCOUNTER — RECORDS - HEALTHEAST (OUTPATIENT)
Dept: ADMINISTRATIVE | Facility: CLINIC | Age: 36
End: 2021-05-27

## 2021-05-28 ENCOUNTER — RECORDS - HEALTHEAST (OUTPATIENT)
Dept: ADMINISTRATIVE | Facility: CLINIC | Age: 36
End: 2021-05-28

## 2021-05-29 NOTE — TELEPHONE ENCOUNTER
RN cannot approve Refill Request    RN can NOT refill this medication med is not covered by policy/route to provider     . Last office visit: 5/24/2019 Farheen Robertson MD Last Physical: Visit date not found Last MTM visit: Visit date not found Last visit same specialty: 5/24/2019 Farheen Robertson MD.  Next visit within 3 mo: Visit date not found  Next physical within 3 mo: Visit date not found      Noemi Mondragon, [facility] [place] Connection Triage/Med Refill 5/29/2019    Requested Prescriptions   Pending Prescriptions Disp Refills     ADVAIR DISKUS 100-50 mcg/dose DISKUS [Pharmacy Med Name: ADVAIR DISKUS 60'S 100/50] 180 each 0     Sig: USE 1 INHALATION TWICE A DAY       There is no refill protocol information for this order

## 2021-05-29 NOTE — PROGRESS NOTES
ASSESSMENT/PLAN:       1. Right foot pain  -Given her history of prior stress fracture and her exam and description of her symptoms I am suspicious for recurrent stress fracture.  As she has been limiting her mobility already for the last several weeks without pain relief I am putting her in a walking boot.  She will wear this for the next 2 to 3 weeks and plan on following up with me at that time.  If her symptoms are persistent we will proceed with MRI, if they are improving we can consider repeat x-ray versus gradual return to activity.  Updating her labs as well as listed below given her prior stress fractures and we may need to consider bone density scanning.  - XR Foot Right 3 or More VWS; Future    2. Vitamin D deficiency  -Has had very low vitamin D in the past, updating her levels again today given her foot pain.  - Comprehensive Metabolic Panel  - Vitamin D, Total (25-Hydroxy)    3. Seasonal allergies  -Exam is most consistent with seasonal allergies although could consider approving sinusitis as well as her children have been sick.  Starting her back on Flonase for now, and she can let me know if things are not improving over the next few weeks and we could consider treating for sinusitis.  - fluticasone propionate (FLONASE) 50 mcg/actuation nasal spray; 2 sprays into each nostril daily.  Dispense: 16 g; Refill: 11    4. Asthma  -Generally doing well with her breathing, asthma control test is normal today.  Renewing her Pro Air for as needed usage and plan on following up in 6 to 12 months.  - albuterol (PROAIR HFA;PROVENTIL HFA;VENTOLIN HFA) 90 mcg/actuation inhaler; Inhale 2 puffs every 6 (six) hours as needed for wheezing.  Dispense: 3 Inhaler; Refill: 3    5. Lipid screening  - Lipid Cascade      Return in about 2 weeks (around 6/7/2019) for recheck.      Farheen Robertson MD      PROGRESS NOTE   5/24/2019    SUBJECTIVE:  Estela Ramirez is a 33 y.o. female  who presents for foot pain.     She  comes in today with foot pain. It has been there for the last few weeks. It is getting worse. The pain is increasing. She did move recently. The other night she was stretching her feet and stretching her toes and lifting them and that made things worse.     She has had a stuffy nose as well. She is not coughing. She has had some headaches, but no sore throat. Her appetite is fine. She has had some mild left ear pain. No sinus pain. She is taking her allergy medication. Her daughter was sick last month and they are sick again and hers has lingered.   Chief Complaint   Patient presents with     Foot Pain     Patient has been having right foot pain for a few weeks, recently the pain has gotten worse. There is no swelling. No injury.      Nasal Congestion     Patient has had nasal congestion for a month. No fevers.          Patient Active Problem List   Diagnosis     Asthma     Lower Back Pain     Nicotine Dependence     Vitamin D Deficiency     MTHFR mutation (H)     History of superficial phlebitis     Varicose vein of leg     Lumbar radiculopathy       Current Outpatient Medications   Medication Sig Dispense Refill     acetaminophen (TYLENOL) 325 MG tablet Take 650 mg by mouth every 6 (six) hours as needed for pain.       ADVAIR DISKUS 100-50 mcg/dose DISKUS USE 1 INHALATION TWICE A DAY 3 each 0     albuterol (PROAIR HFA;PROVENTIL HFA;VENTOLIN HFA) 90 mcg/actuation inhaler Inhale 2 puffs every 6 (six) hours as needed for wheezing. 3 Inhaler 3     cetirizine (ZYRTEC) 10 MG tablet Take 10 mg by mouth daily.       fluticasone propionate (FLONASE) 50 mcg/actuation nasal spray 2 sprays into each nostril daily. 16 g 11     No current facility-administered medications for this visit.        Social History     Tobacco Use   Smoking Status Current Some Day Smoker     Packs/day: 0.25     Types: Cigarettes   Smokeless Tobacco Never Used           OBJECTIVE:        Recent Results (from the past 240 hour(s))   Comprehensive  Metabolic Panel   Result Value Ref Range    Sodium 140 136 - 145 mmol/L    Potassium 4.3 3.5 - 5.0 mmol/L    Chloride 106 98 - 107 mmol/L    CO2 25 22 - 31 mmol/L    Anion Gap, Calculation 9 5 - 18 mmol/L    Glucose 85 70 - 125 mg/dL    BUN 18 8 - 22 mg/dL    Creatinine 0.83 0.60 - 1.10 mg/dL    GFR MDRD Af Amer >60 >60 mL/min/1.73m2    GFR MDRD Non Af Amer >60 >60 mL/min/1.73m2    Bilirubin, Total 0.7 0.0 - 1.0 mg/dL    Calcium 10.3 8.5 - 10.5 mg/dL    Protein, Total 7.1 6.0 - 8.0 g/dL    Albumin 4.2 3.5 - 5.0 g/dL    Alkaline Phosphatase 47 45 - 120 U/L    AST 16 0 - 40 U/L    ALT 13 0 - 45 U/L   Lipid Cascade   Result Value Ref Range    Cholesterol 148 <=199 mg/dL    Triglycerides 44 <=149 mg/dL    HDL Cholesterol 72 >=50 mg/dL    LDL Calculated 67 <=129 mg/dL    Patient Fasting > 8hrs? Yes        Vitals:    05/24/19 0824   BP: 102/62   Patient Site: Left Arm   Patient Position: Sitting   Cuff Size: Adult Regular   Pulse: 90   Temp: 97.8  F (36.6  C)   TempSrc: Oral   SpO2: 99%   Weight: 149 lb 11.2 oz (67.9 kg)     Weight: 149 lb 11.2 oz (67.9 kg)          Physical Exam:  GENERAL APPEARANCE: A&A, NAD, well hydrated, well nourished  SKIN:  Normal skin turgor, no lesions/rashes   HEENT: moist mucous membranes, no rhinorrhea, pharynx shows some mild postnasal drip, tympanic membranes are clear bilaterally  NECK: Normal, without lymphadenopathy  CV: RRR, no M/G/R   LUNGS: CTAB  EXTREMITY: no edema, right foot with tenderness to palpation over the second and third metatarsals over the plantar aspect of the foot without any other obvious abnormalities or tenderness to palpation  NEURO: no gross deficits     Xr Foot Right 3 Or More Vws    Result Date: 5/24/2019  EXAM: XR FOOT RIGHT 3 OR MORE VWS LOCATION: [facility] DATE/TIME: 5/24/2019 8:50 AM INDICATION: Right foot pain over midfoot for several weeks COMPARISON: 09/17/2017. FINDINGS: Negative foot. No fracture or dislocation. No change from 09/17/2017.

## 2021-05-30 ENCOUNTER — RECORDS - HEALTHEAST (OUTPATIENT)
Dept: ADMINISTRATIVE | Facility: CLINIC | Age: 36
End: 2021-05-30

## 2021-05-30 VITALS — WEIGHT: 167.8 LBS | BODY MASS INDEX: 26.28 KG/M2

## 2021-05-30 VITALS — WEIGHT: 159.4 LBS | BODY MASS INDEX: 24.97 KG/M2

## 2021-05-30 VITALS — WEIGHT: 166.7 LBS | BODY MASS INDEX: 26.11 KG/M2

## 2021-05-30 VITALS — BODY MASS INDEX: 25.72 KG/M2 | WEIGHT: 164.2 LBS

## 2021-05-30 VITALS — HEIGHT: 67 IN | WEIGHT: 161 LBS | BODY MASS INDEX: 25.27 KG/M2

## 2021-05-30 VITALS — WEIGHT: 161.7 LBS | BODY MASS INDEX: 25.33 KG/M2

## 2021-05-30 VITALS — WEIGHT: 154 LBS | BODY MASS INDEX: 24.12 KG/M2

## 2021-05-31 VITALS — BODY MASS INDEX: 23.36 KG/M2 | WEIGHT: 151.38 LBS

## 2021-05-31 VITALS — BODY MASS INDEX: 26.52 KG/M2 | WEIGHT: 169.3 LBS

## 2021-05-31 VITALS — BODY MASS INDEX: 26.63 KG/M2 | WEIGHT: 170 LBS

## 2021-05-31 VITALS — WEIGHT: 151 LBS | HEIGHT: 68 IN | BODY MASS INDEX: 22.88 KG/M2

## 2021-05-31 VITALS — BODY MASS INDEX: 26.64 KG/M2 | WEIGHT: 170.1 LBS

## 2021-05-31 VITALS — HEIGHT: 67 IN | BODY MASS INDEX: 26.68 KG/M2 | WEIGHT: 170 LBS

## 2021-05-31 VITALS — WEIGHT: 149 LBS | BODY MASS INDEX: 22.58 KG/M2 | HEIGHT: 68 IN

## 2021-05-31 VITALS — BODY MASS INDEX: 26.72 KG/M2 | WEIGHT: 170.6 LBS

## 2021-05-31 VITALS — BODY MASS INDEX: 27.82 KG/M2 | WEIGHT: 177.6 LBS

## 2021-05-31 VITALS — BODY MASS INDEX: 23.34 KG/M2 | WEIGHT: 149 LBS

## 2021-06-01 ENCOUNTER — RECORDS - HEALTHEAST (OUTPATIENT)
Dept: ADMINISTRATIVE | Facility: CLINIC | Age: 36
End: 2021-06-01

## 2021-06-01 VITALS — BODY MASS INDEX: 22.61 KG/M2 | WEIGHT: 146.5 LBS

## 2021-06-02 ENCOUNTER — RECORDS - HEALTHEAST (OUTPATIENT)
Dept: ADMINISTRATIVE | Facility: CLINIC | Age: 36
End: 2021-06-02

## 2021-06-02 VITALS — BODY MASS INDEX: 23.15 KG/M2 | WEIGHT: 150 LBS

## 2021-06-02 VITALS — BODY MASS INDEX: 23.4 KG/M2 | WEIGHT: 149.1 LBS | HEIGHT: 67 IN

## 2021-06-02 NOTE — PROGRESS NOTES
Assessment:         1. Routine general medical examination at a health care facility    2. Chronic nonintractable headache, unspecified headache type    3. RUQ abdominal pain    4. Pruritus    5. Vitamin D deficiency    6. Lipid screening    7. Immunization due        Plan:      1. Routine general medical examination at a health care facility  -Routine health maintenance discussion:  No smoking, limited alcohol (7 or less servings per week), 5 fruits/veg servings per day, 200 minutes of exercise per week.  Daily calcium/vitamin D guidelines, bone health, colon cancer screening beginning at age 50.  Accident avoidance, sun screen.     2. Chronic nonintractable headache, unspecified headache type  -Discussed that her symptoms seem most consistent with either tension headache or medication overuse headache.  Unfortunately she does need ibuprofen for her back issues, but I am going to start her on a course of prednisone to see if this helps with her headache for now.  Additionally, I am going to have her proceed with a CT scan of the head as well as make sure there is no obvious intracranial abnormality.  If things are not improving she will let me know, if they are getting better with the prednisone we can continue this for a short course of time.  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  She will follow-up in 3 to 4 months if things are not improving, sooner if they are worsening.  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  - CT Head Without Contrast; Future  - predniSONE (DELTASONE) 10 mg tablet; Take 4 tabs daily for 3 days, then 3 tabs daily for 3 days, then 2 tabs daily for 3 days, then 1 tab daily for 3 days, then stop..  Dispense:  12 tablet; Refill: 0    3. RUQ abdominal pain  -Concerning for possible gallbladder issues given her fertility and recent pregnancy.  Labs thankfully have been normal, ultrasound of the abdomen ordered for further evaluation.  If this is unremarkable we could consider further evaluation and possible treatment for gastritis given her  chronic ibuprofen usage and smoking.  - Comprehensive Metabolic Panel  - US Abdomen Limited; Future    4. Pruritus  -Unsure etiology but I suspect is related to eczema.  I am going to check labs as listed below to look for other obvious exacerbations, going to have her try triamcinolone ointment in the areas that are flared and she will follow-up if things are not improving at which point I could refer her to dermatology for further evaluation and assessment.  - HM1(CBC and Differential)  - Erythrocyte Sedimentation Rate  - Thyroid Cascade  - Glycosylated Hemoglobin A1c  - HM1 (CBC with Diff)  - triamcinolone (KENALOG) 0.1 % ointment; Apply small amount to affected 2-3 times daily until gone  Dispense: 30 g; Refill: 2    5. Vitamin D deficiency  -Updating levels today adjust medication as needed  - Vitamin D, Total (25-Hydroxy)    6. Lipid screening  - Lipid Cascade    7. Immunization due  - Influenza,Seasonal,Quad,INJ =/>6months         Subjective:      Estela Ramirez is a 33 y.o. female who presents for an annual exam. The patient is sexually active. The patient participates in regular exercise: no. The patient reports that there is not domestic violence in her life.     She has had some headaches since July. She did go to [place] last year in July, quit taking birth control and they went away. Switched to [place], then started getting headaches again and quit taking her allergy pill. It feels like it is all over but it feels like it is in her forehead, and feels like pressure. Days when sheis not at work she still has headaches. She does take ibuprofen, did try aleve as well and  that doesn't help. It's not excruciating, is constantly there. She does take ibuprofen daily due to her back and other issues. Her brother did just get , her  is in [place], left 10/20 and comes home on 10/31. Another brother is sick with cancer. Just bought a house. She does probably need her eyes checked again, does probably need a stronger prescription but no real vision changes.     She does have some pain over the distal knuckle on the right third finger. If she hits it or touches it or with constant bending it does hurt. She can bend it, but doesn't know why it hurts. She has no known injury.     She does have worse hip pain as well, it gest worse going from sitting to standing and when she is laying on either side. There is a point tender spot on there.     She did go to walk in clinic. She does have abdominal pain on the right. The walk in clinic did blood work and it was normal. The pain will come and go. She does have normal stooling. She didn't note that there is any change with greasy foods. It is under the ribs on the right. No nausea. Her stools are softer.     She does itch everywhere, especially her palms, head and legs. She does have a history of eczema, she does have some hydrocortisone and was using this on her antecubital fossae but her palms and head she doesn't know what to do.     Breathing is fine, advair is working well for her. Her back has been ok as well.     Healthy Habits:   Regular Exercise: No  Sunscreen Use: No  Healthy Diet: No  Dental Visits Regularly: Yes  Seat Belt: Yes  Sexually active: Yes  Self Breast Exam Monthly:No  Hemoccults: N/A  Flex Sig: N/A  Colonoscopy: N/A  Lipid Profile: Yes  Glucose Screen: Yes  Prevention of Osteoporosis: No  Last Dexa: N/A  Guns at Home:  No      Immunization History   Administered Date(s) Administered     DT (pediatric) 10/11/2000     Hep A, historic 02/09/2009, 02/26/2010     Hep B, historic 10/11/2000, 01/17/2001, 09/20/2006      Influenza,seasonal quad, PF, =/> 6months 12/15/2016     Influenza,seasonal,quad inj =/> 6months 10/13/2017     Td,adult,historic,unspecified 2008     Tdap 2013, 2017     Immunization status: up to date and documented, missing doses of flu.    No exam data present    Gynecologic History  Patient's last menstrual period was 10/12/2019 (exact date).  Contraception: none  Last Pap: 10/13/17. Results were: normal  Last mammogram: N/A. Results were: N/A      OB History    Para Term  AB Living   2 1 1     1   SAB TAB Ectopic Multiple Live Births         0 1      # Outcome Date GA Lbr Oswald/2nd Weight Sex Delivery Anes PTL Lv   2 Term 17 40w2d 04:03 / 01:15 7 lb 5.8 oz (3.34 kg) F Vag-Spont EPI N BELEM   1                 Current Outpatient Medications   Medication Sig Dispense Refill     acetaminophen (TYLENOL) 325 MG tablet Take 650 mg by mouth every 6 (six) hours as needed for pain.       ADVAIR DISKUS 100-50 mcg/dose DISKUS USE 1 INHALATION TWICE A  each 1     albuterol (PROAIR HFA;PROVENTIL HFA;VENTOLIN HFA) 90 mcg/actuation inhaler Inhale 2 puffs every 6 (six) hours as needed for wheezing. 3 Inhaler 3     fluticasone propionate (FLONASE) 50 mcg/actuation nasal spray 2 sprays into each nostril daily. 16 g 11     cetirizine (ZYRTEC) 10 MG tablet Take 10 mg by mouth daily.       No current facility-administered medications for this visit.      Past Medical History:   Diagnosis Date     Asthma      MTHFR mutation (H)      Past Surgical History:   Procedure Laterality Date     HEMILAMINOTOMY LUMBAR SPINE Left 10/14/2003    L4-5     Cephalosporins and Other allergy (see comments)  Family History   Problem Relation Age of Onset     Varicose Veins Mother      Pancreatic cancer Brother      Pancreatic cancer Maternal Grandmother      Throat cancer Maternal Grandfather      Social History     Socioeconomic History     Marital status:      Spouse name: Not on file     Number  "of children: Not on file     Years of education: Not on file     Highest education level: Not on file   Occupational History     Not on file   Social Needs     Financial resource strain: Not on file     Food insecurity:     Worry: Not on file     Inability: Not on file     Transportation needs:     Medical: Not on file     Non-medical: Not on file   Tobacco Use     Smoking status: Current Some Day Smoker     Packs/day: 0.25     Types: Cigarettes     Smokeless tobacco: Never Used   Substance and Sexual Activity     Alcohol use: No     Drug use: No     Sexual activity: Yes     Partners: Male   Lifestyle     Physical activity:     Days per week: Not on file     Minutes per session: Not on file     Stress: Not on file   Relationships     Social connections:     Talks on phone: Not on file     Gets together: Not on file     Attends Spiritism service: Not on file     Active member of club or organization: Not on file     Attends meetings of clubs or organizations: Not on file     Relationship status: Not on file     Intimate partner violence:     Fear of current or ex partner: Not on file     Emotionally abused: Not on file     Physically abused: Not on file     Forced sexual activity: Not on file   Other Topics Concern     Not on file   Social History Narrative     Not on file       Review of Systems  Review of Systems      With the exception of the aforementioned issues, 12 point, comprehensive ROS was done and was negative.     Objective:         Vitals:    10/29/19 0925   BP: 116/64   Pulse: 90   Temp: 98  F (36.7  C)   TempSrc: Oral   SpO2: 98%   Weight: 155 lb 3.2 oz (70.4 kg)   Height: 5' 7\" (1.702 m)     Body mass index is 24.31 kg/m .    Physical  Physical Exam     Gen: Well developed, well nourished, no acute distress.  HEENT: normocephalic/atraumatic, PERRLA/EOMI, TMs: Gray, normal light reflex, no nasal discharge.  Oral mucosa: no erythema/exudate  Neck: No LAD/masses/thyromegaly  Lungs: clear " bilaterally  Heart: regular rate and rhythm, no murmurs/gallops/rubs  Breasts: symmetric, no masses/skin changes, nipple discharge, or axillary LAD.  BSE reviewed.  Abdomen: Normal bowel sounds, soft, non-tender, non-distended, no masses, neg Alston's/McBurney's, no rebound/guarding  Genital: deferred, no complaints  Lymphatics: no supraclavicular/axillary/epitrochlear/inguinal LAD. No edema.  Neuro: A&O x 3, CN II-XII intact, strength 5/5, reflexes symmetric, sensory intact to light touch.  Psych: Behavior appropriate, engaging.  Thought processes congruent, non-tangential.  Musculoskeletal: no gross deformities.  Skin: no rashes or lesions.

## 2021-06-02 NOTE — TELEPHONE ENCOUNTER
Medication Question or Clarification  Who is calling: Pharmacy: [facility]  What medication are you calling about? (include dose and sig)   predniSONE (DELTASONE) 10 mg tablet 12 tablet 0 10/29/2019     Sig: Take 4 tabs daily for 3 days, then 3 tabs daily for 3 days, then 2 tabs daily for 3 days, then 1 tab daily for 3 days, then stop..    Sent to pharmacy as: predniSONE 10 mg tablet (DELTASONE)    E-Prescribing Status: Receipt confirmed by pharmacy (10/29/2019 10:23 AM CDT)        Who prescribed the medication?: Farheen Robertson MD  What is your question/concern?: the directions do not match the dispense amount.   Pharmacy: [facility]  Okay to leave a detailed message?: Yes  Site CMT - Please call the pharmacy to obtain any additional needed information.

## 2021-06-03 VITALS
DIASTOLIC BLOOD PRESSURE: 64 MMHG | OXYGEN SATURATION: 98 % | HEART RATE: 90 BPM | BODY MASS INDEX: 24.36 KG/M2 | HEIGHT: 67 IN | TEMPERATURE: 98 F | SYSTOLIC BLOOD PRESSURE: 116 MMHG | WEIGHT: 155.2 LBS

## 2021-06-03 VITALS — BODY MASS INDEX: 23.45 KG/M2 | WEIGHT: 149.7 LBS

## 2021-06-03 VITALS — BODY MASS INDEX: 23.34 KG/M2 | WEIGHT: 149 LBS

## 2021-06-05 ENCOUNTER — RECORDS - HEALTHEAST (OUTPATIENT)
Dept: VASCULAR SURGERY | Facility: CLINIC | Age: 36
End: 2021-06-05

## 2021-06-05 VITALS
HEIGHT: 67 IN | SYSTOLIC BLOOD PRESSURE: 127 MMHG | BODY MASS INDEX: 25.8 KG/M2 | OXYGEN SATURATION: 99 % | WEIGHT: 164.4 LBS | DIASTOLIC BLOOD PRESSURE: 72 MMHG | HEART RATE: 82 BPM

## 2021-06-05 VITALS
WEIGHT: 164 LBS | DIASTOLIC BLOOD PRESSURE: 76 MMHG | HEART RATE: 70 BPM | RESPIRATION RATE: 16 BRPM | OXYGEN SATURATION: 100 % | SYSTOLIC BLOOD PRESSURE: 124 MMHG | TEMPERATURE: 97.7 F | BODY MASS INDEX: 25.69 KG/M2

## 2021-06-05 DIAGNOSIS — I83.90 ASYMPTOMATIC VARICOSE VEINS: ICD-10-CM

## 2021-06-07 NOTE — TELEPHONE ENCOUNTER
RN cannot approve Refill Request    RN can NOT refill this medication med is not covered by policy/route to provider     . Last office visit: 5/24/2019 Farheen Robertson MD Last Physical: 10/29/2019 Last MTM visit: Visit date not found Last visit same specialty: 5/24/2019 Farheen Robertson MD.  Next visit within 3 mo: Visit date not found  Next physical within 3 mo: Visit date not found      Noemi Mondragon, [facility] Connection Triage/Med Refill 3/25/2020    Requested Prescriptions   Pending Prescriptions Disp Refills     WIXELA INHUB 100-50 mcg/dose DISKUS [Pharmacy Med Name: WIXELA  100MCG 50MCG  INH  INHUB] 180 each 1     Sig: USE 1 INHALATION BY MOUTH  TWICE A DAY       There is no refill protocol information for this order

## 2021-06-07 NOTE — PROGRESS NOTES
"Estela Ramirez is a 34 y.o. female who is being evaluated via a billable telephone visit.      The patient has been notified of following:     \"This telephone visit will be conducted via a call between you and your physician/provider. We have found that certain health care needs can be provided without the need for a physical exam.  This service lets us provide the care you need with a short phone conversation.  If a prescription is necessary we can send it directly to your pharmacy.  If lab work is needed we can place an order for that and you can then stop by our lab to have the test done at a later time.    If during the course of the call the physician/provider feels a telephone visit is not appropriate, you will not be charged for this service.\"         Estela Ramirez complains of    Chief Complaint   Patient presents with     Thrush     Pt c/o cotton feel in back of tounge and throat, she thinks it is from her Advair inhaler. x one week     Cough     Pt c/o phlegmy cough x 2 months, she denies fever and pain. She is requesting prednisone.       I have reviewed and updated the patient's Past Medical History, Social History, Family History and Medication List.    ALLERGIES  Cephalosporins and Other allergy (see comments)    Additional provider notes: See below    Assessment/Plan:    1. Mild persistent asthma with acute exacerbation     - predniSONE (DELTASONE) 20 MG tablet; Take 40 mg by mouth daily for 5 days.  Dispense: 10 tablet; Refill: 0  - albuterol (PROVENTIL) 2.5 mg /3 mL (0.083 %) nebulizer solution; Take 3 mL (2.5 mg total) by nebulization every 4 (four) hours as needed for wheezing or shortness of breath.  Dispense: 30 vial; Refill: 0    2. Thrush     - nystatin (MYCOSTATIN) 100,000 unit/mL suspension; Take 5 mL (500,000 Units total) by mouth 4 (four) times a day for 10 days.  Dispense: 200 mL; Refill: 0    Reinforced to the patient how she should be rinsing her mouth out after each use of the Advair and " even when she is brushing her teeth to make sure that she brushes her tongue and even the sides of the mouth would be good to gently brush.  Make sure she is drinking good amounts of fluids and protecting herself from other individuals.    Follow-up as needed      Phone call duration:  8 minutes    Sheri Loera MD    Telephone visit due to COVID-19 pandemic  Patient has a history of persistent asthma and has a flare of her asthma.  She has had a cough for 2 months with some sputum production.  She is noticed some wheezing and shortness of breath and has been using her Advair 1 puff twice a day.  She has developed symptoms consistent with thrush with a coating on her tongue and back of the throat.  She rinses her mouth out after using Advair.  She is requesting a refill of albuterol solution for neb treatments and is also requesting a prednisone refill.  She was prescribed prednisone in October 2019 for a persistent headache but did not use it at that time or even get it filled.    The patient does not have a fever and other family members are doing well.    .Jonnathan Loera MD

## 2021-06-07 NOTE — TELEPHONE ENCOUNTER
Orders being requested:   1. Neb Tubing  2. Mouth piece  3. Medication chamber    Reason service is needed/diagnosis: Asthma    When are orders needed by: ASAP    [facility]: [id number] [place], [place] - [address] AT [facility] [address]     Okay to leave detailed message?  Yes    Please send script to patients pharmacy and inform the patient of the outcome to this request.

## 2021-06-07 NOTE — TELEPHONE ENCOUNTER
Patient is aware. She has already spoken with the pharmacy and was told they had the supplies in stock.

## 2021-06-07 NOTE — TELEPHONE ENCOUNTER
rx sent, this is not something the pharmacy always carries, so she should call ahead before she goes in to pick it up.

## 2021-06-08 NOTE — PROGRESS NOTES
SUBJECTIVE:  Estela Ramirez is a 31 y.o. female.  She does have the heterozygous gene for MTHFR. She is doing well. She is not feeling any obvious baby movement. Sometimes she does think taht she feels a bit here and there. Her pants are tight as well. She has had no headaches, blurry vision or RUQ pain. She did have heartburn last night.   Her breathing is fine.     Chief Complaint   Patient presents with     Routine Prenatal Visit       OBJECTIVE:   Visit Vitals     /60     Pulse 68     Wt 154 lb (69.9 kg)     LMP 2016 (Exact Date)     BMI 24.12 kg/m2      Please see prenatal data for fundal height and fetal HR.     ASSESSMENT/PLAN:   Estela Ramirez is a 31 y.o. female  who presents for prenatal check. Patient is doing well.   F/u in four weeks, US ordered.   Will consider some behavior modification for her smoking to see if that will help.

## 2021-06-08 NOTE — PROGRESS NOTES
SUBJECTIVE:  Estela Ramirez is a 31 y.o. female.  She is doing pretty well. She is wondering if she can get a prescription for compression stockings. She has a varicose vein in her upper thigh on the right.     Baby is moving, but light. Some itching on her chest with signs of excoriation.  No longer buying cigarettes. 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   Chief Complaint   Patient presents with     Routine Prenatal Visit     20 week prenatal care.        OBJECTIVE:   Visit Vitals     /56 (Patient Site: Right Arm, Patient Position: Sitting, Cuff Size: Adult Regular)     Wt 159 lb 6.4 oz (72.3 kg)     LMP 2016 (Exact Date)     Breastfeeding No     BMI 24.97 kg/m2      Please see prenatal data for fundal height and fetal HR.     Excoriation on her chest, upper breasts. No rash  ASSESSMENT/PLAN:   Estela Ramirez is a 31 y.o. female  who presents for prenatal check. Patient is doing well.   Prescription for support hose supplied for the patient.   Start using the desonide and try a new size bra to see if that helps with the discomfort. Take bra off when she gets home.   F/u in four weeks.   No longer buying cigarettes,

## 2021-06-09 NOTE — PROGRESS NOTES
SUBJECTIVE:  Estela Ramirez is a 31 y.o. female.  She does have a cold, stuffy nose. Her son has been sick with a cold. Her breathing is ok for now. She has had no fevers, her asthma is doing fine for now as well, not needing her inhaler more than usual.     Baby is moving. She has had no blurry vision, ruq pain. She has had some headaches off and on with her congestion.   Chief Complaint   Patient presents with     Routine Prenatal Visit     24 week prenatal care.        OBJECTIVE:   Visit Vitals     /66 (Patient Site: Left Arm, Patient Position: Sitting, Cuff Size: Adult Regular)     Wt 161 lb 11.2 oz (73.3 kg)     LMP 2016 (Exact Date)     Breastfeeding No     BMI 25.33 kg/m2      Please see prenatal data for fundal height and fetal HR.     ASSESSMENT/PLAN:   Estela Ramirez is a 31 y.o. female  who presents for prenatal check. Patient is doing well  She does have a cold right now but a normal exam. For now I would recommend supportive cares with tylenol as needed, nasal saline as well as honey for the cough. Use inhaler as needed and f/u next week if things aren't getting better.   Baby is doing well, one hour glucose next time.

## 2021-06-09 NOTE — PROGRESS NOTES
SUBJECTIVE:  Estela Ramirez is a 31 y.o. female.  Her left wrist has been bothering her. It is achy and she feels like she can't hold a box. She thought that she had a wrist brace for it, but its her right wrist. She had De quer on that side. She has had ganglion cysts on this side in the past and thought that she did but doesn't. She has no tingling in her fingers on that side. The pain is over the wrist itself. There has been no injury. She is sure it is from lifting things.     Baby is moving is moving well. No blurry vision, headaches, ruq pain. Minimal swelling in her feet at the end of the day. Achy everywhere.   Chief Complaint   Patient presents with     Routine Prenatal Visit     28 week prenatal care.        OBJECTIVE:   /60 (Patient Site: Right Arm, Patient Position: Sitting, Cuff Size: Adult Regular)  Wt 164 lb 3.2 oz (74.5 kg)  LMP 2016 (Exact Date)  Breastfeeding? No  BMI 25.72 kg/m2   Please see prenatal data for fundal height and fetal HR.     Exam shows positive Finkelstein's test today on the left wrist    ASSESSMENT/PLAN:   Estela Ramirez is a 31 y.o. female  who presents for prenatal check. Patient is doing well.   We discussed trying a wrist brace today for the wrist and she will let me know if it doesn't help and we can consider further intervention. One hour glucose today as well as RPR, hemoglobin and Tdap  Discussed cetirizine as well for allergies.

## 2021-06-09 NOTE — PROGRESS NOTES
1545 pt arrived to [facility] to be evaluated for abdominal cramping. Telephone orders rec'vd for wet, prep, UA and NST. Pt declines need to urinate at this time.[place] and US monitors applied, OB OP admission navigator completed. Wet prep and UA collected and sent to lab for evaluation.   Spoke with Dr. Robertson, results reviewed from wet prep and UA. would like cervical length and right ovary US.    1834 page placed to Dr. Robertson to review US results. Return call rec'vd. Results of US reviewed. OK to d/c pt to home.   1845  Pt agreeable with plan. D/C instructions reviewed with pt. Questions answered.   1852 pt d/c to home ambulatory and undelivered.   Ellen Franco RN

## 2021-06-10 NOTE — PROGRESS NOTES
SUBJECTIVE:  Estela Ramirez is a 31 y.o. female.  She is generally doing ok. She is coughing again. It is still productive. She is breathing ok. She does have seasonal allergies, she is taking claritin. She does have itchy eyes, nose and sneezing which is does help with. She is using her inhalers.     She has no headaches, blurry vision, ruq pain. She has no swelling. Baby is moving well.     Chief Complaint   Patient presents with     Routine Prenatal Visit     34 wk ob       OBJECTIVE:   /72  Pulse 78  Wt 170 lb (77.1 kg)  LMP 2016 (Exact Date)  BMI 26.63 kg/m2   Please see prenatal data for fundal height and fetal HR.     CV: RRR, nl s1, s2, no murmurs  Lungs: Clear bilaterally, pharynx with minimal signs of post nasal drip.     ASSESSMENT/PLAN:   Estela Ramirez is a 31 y.o. female  who presents for prenatal check. Patient is doing well.   Due to her measuring small for dates will update a growth US, she is otherwise doing well though and will f/u in two weeks.   Lastly, she will try some Afrin to see if this helps with her cough, if it does then we can have her trial some flonase to see if that works for her symptoms. If not she will let me know.

## 2021-06-10 NOTE — PROGRESS NOTES
SUBJECTIVE:  Estela Ramirez is a 31 y.o. female.  Cough is still productive although her breathing is better. Baby is moving well. She has had no headaches, blurry vision ruq pain. No swelling. Work is ok. She has had some [place] contractions, otherwise none.   Chief Complaint   Patient presents with     Routine Prenatal Visit     32 weeks prenatal care.        OBJECTIVE:   BP 92/64 (Patient Site: Left Arm, Patient Position: Sitting, Cuff Size: Adult Regular)  Wt 167 lb 12.8 oz (76.1 kg)  LMP 2016 (Exact Date)  Breastfeeding? No  BMI 26.28 kg/m2   Please see prenatal data for fundal height and fetal HR.     ASSESSMENT/PLAN:   Estela Ramirez is a 31 y.o. female  who presents for prenatal check. Patient is doing well.   Breathing has improved, for now follow and let me know if things worsen again.   If still measuring small next time will order an US for growth

## 2021-06-10 NOTE — PROGRESS NOTES
SUBJECTIVE:  Estela Ramirez is a 31 y.o. female.  Baby is moving well. She has had no headaches, blurry vision, ruq pain. Minimal swelling.     She started with allergies about 3 weeks ago, and then it turned into a stuffy nose and now has been coughing. It is productive at times. She feels quite short of breath and is using her inhaler quite a bit. She has no fevers. Appetite is down as well, can't eat too much.   Chief Complaint   Patient presents with     Routine Prenatal Visit     30 week prenatal care. Patient states she feels short of breath or winded lately due to a productive cough for three weeks.      [headaches, vision changes, edema, abdominal pain, cramping, BH] [fluid leaking, discharge]    OBJECTIVE:   /62 (Patient Site: Left Arm, Patient Position: Sitting, Cuff Size: Adult Regular)  Pulse 92  Temp 98.6  F (37  C) (Oral)   Wt 166 lb 11.2 oz (75.6 kg)  LMP 2016 (Exact Date)  SpO2 100%  Breastfeeding? No  BMI 26.11 kg/m2   Please see prenatal data for fundal height and fetal HR.     Head is Normocephalic atraumatic tympanic membrane's are clear bilaterally.  Heart is regular in rate and rhythm.  Lungs are clear to auscultation bilaterally without wheezing or crackles.    ASSESSMENT/PLAN:   Estela Ramirez is a 31 y.o. female  who presents for prenatal check. Patient is doing well.   Try azithromycin for now, will call if not improving and will send in a short prednisone taper.

## 2021-06-10 NOTE — TELEPHONE ENCOUNTER
RN cannot approve Refill Request    RN can NOT refill this medication Protocol failed and NO refill given. Last office visit: Visit date not found Last Physical: Visit date not found Last MTM visit: Visit date not found Last visit same specialty: 5/24/2019 Farheen Robertson MD.  Next visit within 3 mo: Visit date not found  Next physical within 3 mo: Visit date not found      Sadaf Burton, [facility] Triage/Med Refill 8/11/2020    Requested Prescriptions   Pending Prescriptions Disp Refills     WIXELA INHUB 100-50 mcg/dose DISKUS [Pharmacy Med Name: WIXELA  100MCG 50MCG  INH  INHUB] 180 each 3     Sig: USE 1 INHALATION BY MOUTH  TWICE DAILY       There is no refill protocol information for this order

## 2021-06-11 NOTE — ANESTHESIA POSTPROCEDURE EVALUATION
Patient: Estela Ramirez  * No procedures listed *  Anesthesia type: epidural    Patient location: PACU  Last vitals:   Vitals:    06/29/17 0915   BP: 93/52   Pulse: 66   Resp:    Temp:    SpO2:      Post vital signs: stable  Level of consciousness: awake and responds to simple questions  Post-anesthesia pain: pain controlled  Post-anesthesia nausea and vomiting: no  Pulmonary: unassisted, return to baseline  Cardiovascular: stable and blood pressure at baseline  Hydration: adequate  Anesthetic events: no    QCDR Measures:  ASA# 11 - Jennifer-op Cardiac Arrest: ASA11B - Patient did NOT experience unanticipated cardiac arrest  ASA# 12 - Jennifer-op Mortality Rate: ASA12B - Patient did NOT die  ASA# 13 - PACU Re-Intubation Rate: ASA13B - Patient did NOT require a new airway mgmt  ASA# 10 - Composite Anes Safety: ASA10A - No serious adverse event  ASA# 38 - New Corneal Injury: ASA38A - No new exposure keratitis or corneal abrasion in PACU    Additional Notes:

## 2021-06-11 NOTE — ANESTHESIA PROCEDURE NOTES
Epidural Block    Patient location during procedure: OB  Time Called: 6/29/2017 4:05 AM  Reason for Block:labor epidural  Staffing:  Performing  Anesthesiologist: GABY MTZ  Preanesthetic Checklist  Completed: patient identified, risks, benefits, and alternatives discussed, timeout performed, consent obtained, at patient's request, airway assessed, oxygen available, suction available, emergency drugs available and hand hygiene performed  Procedure  Patient position: sitting  Prep: ChloraPrep  Patient monitoring: continuous pulse oximetry  Approach: midline  Location: L3-L4  Injection technique: DANIELLE saline  Number of Attempts:1  Needle  Needle type: Michael   Needle gauge: 18 G     Catheter in Space: 2  Assessment  Sensory level:  No complications

## 2021-06-11 NOTE — PROGRESS NOTES
SUBJECTIVE:  Estela Ramirez is a 31 y.o. female.  She is overall doing well. She does have some increased pain in her legs and feet. She did have a sharp pain at times on her left side, comes and goes. Doesn't last very long. Seems to be a minutes or two. She was trying to get her son in the car and the other day it was when she was sitting there.     She is having no headaches, blurry vision, ruq pain. Minimal swelling at times. Baby is moving well.   Chief Complaint   Patient presents with     Routine Prenatal Visit     38 weeks prenatal care.        OBJECTIVE:   /68 (Patient Site: Left Arm, Patient Position: Sitting, Cuff Size: Adult Regular)  Wt 169 lb 4.8 oz (76.8 kg)  LMP 2016 (Exact Date)  Breastfeeding? No  BMI 26.52 kg/m2   Please see prenatal data for fundal height and fetal HR.     ASSESSMENT/PLAN:   Estela Ramirez is a 31 y.o. female  who presents for prenatal check. Patient is doing well.   Because she is measuring small I did order a BPP today  UA is normal otherwise. F/u in one week

## 2021-06-11 NOTE — PROGRESS NOTES
SUBJECTIVE:  Estela Ramirez is a 31 y.o. female.  She is getting more uncomfortable. She does have some cramping and does have increasingly intense BH contractions. Baby is moving ok. She has had no headaches, blurry vision, ruq pain or swelling.   Chief Complaint   Patient presents with     Routine Prenatal Visit     39 weeks prenatal care.          OBJECTIVE:   /62 (Patient Site: Left Arm, Patient Position: Sitting, Cuff Size: Adult Regular)  Wt 177 lb 9.6 oz (80.6 kg)  LMP 2016 (Exact Date)  Breastfeeding? No  BMI 27.82 kg/m2   Please see prenatal data for fundal height and fetal HR.     ASSESSMENT/PLAN:   Estela Ramirez is a 31 y.o. female  who presents for prenatal check. Patient is doing well.   F/u next week if no baby and can discuss IOL at that time.

## 2021-06-11 NOTE — PROGRESS NOTES
"Pt reports that after arriving at work this AM she voided.  Upon return to her desk she had another \"leak of fluid\" that was clear to white in color with mucus and saturated approx. 2x4in of her underwear.  Pt denies any continuous leaking of fluid, cramps, or vaginal irritation.  Wet prep and ROMplus collected and sent to lab. Alie Richards    "

## 2021-06-11 NOTE — PROGRESS NOTES
Pt's labs WNL/negative.  Paged provider.  Once updated, anticipate discharge to home. Alie Richards

## 2021-06-11 NOTE — PROGRESS NOTES
SUBJECTIVE:  Estela Ramirez is a 31 y.o. female.  She is doing ok. Some increasing leg soreness and swelling. Baby does feel low as well. She has had some rosita rivera but no other contractions. She has had no headaces, blurry vision or ruq pain.     Chief Complaint   Patient presents with     Routine Prenatal Visit     37 week prenatal care.          OBJECTIVE:   /68 (Patient Site: Right Arm, Patient Position: Sitting, Cuff Size: Adult Regular)  Wt 170 lb 9.6 oz (77.4 kg)  LMP 2016 (Exact Date)  Breastfeeding? No  BMI 26.72 kg/m2   Please see prenatal data for fundal height and fetal HR.     ASSESSMENT/PLAN:   Estela Ramirez is a 31 y.o. female  who presents for prenatal check. Patient is doing well.   F/u in one week.

## 2021-06-11 NOTE — PROGRESS NOTES
SUBJECTIVE:  Estela Ramirez is a 31 y.o. female.  Trip to  yesterday, no ROM. Baby is low. Some [place] contractions, uncomfortable. No swelling. She has had some headaches, mainly at night. No blurry vision, ruq pain. Baby is moving, maybe somewhat diminished.     Her  is leaving on - for active duty for the [employer].     Chief Complaint   Patient presents with     Routine Prenatal Visit     36 weeks prental care.          OBJECTIVE:   /68 (Patient Site: Right Arm, Patient Position: Sitting, Cuff Size: Adult Regular)  Wt 170 lb 1.6 oz (77.2 kg)  LMP 2016 (Exact Date)  Breastfeeding? No  BMI 26.64 kg/m2   Please see prenatal data for fundal height and fetal HR.     ASSESSMENT/PLAN:   Estela Ramirez is a 31 y.o. female  who presents for prenatal check. Patient is doing well.   She will f/u next week, GBS today.

## 2021-06-12 NOTE — TELEPHONE ENCOUNTER
Refill Approved    Rx renewed per Medication Renewal Policy. Medication was last renewed on 1/3/20, last OV 3/24/20.    Sadaf Burton, [facility] Triage/Med Refill 10/17/2020     Requested Prescriptions   Pending Prescriptions Disp Refills     albuterol (PROAIR HFA;PROVENTIL HFA;VENTOLIN HFA) 90 mcg/actuation inhaler [Pharmacy Med Name: ALBUTEROL HFA (PA)] 34 g 0     Sig: USE 2 PUFFS BY MOUTH EVERY  6 HOURS AS NEEDED FOR  WHEEZING       Albuterol/Levalbuterol Refill Protocol Passed - 10/13/2020  9:02 PM        Passed - PCP or prescribing provider visit in last year     Last office visit with prescriber/PCP: 5/24/2019 Farheen Robertson MD OR same dept: Visit date not found OR same specialty: 5/24/2019 Farheen Robertson MD Last physical: 10/29/2019       Next appt within 3 mo: Visit date not found  Next physical within 3 mo: Visit date not found  Prescriber OR PCP: Farheen Robertson MD  Last diagnosis associated with med order: 1. Asthma  - albuterol (PROAIR HFA;PROVENTIL HFA;VENTOLIN HFA) 90 mcg/actuation inhaler [Pharmacy Med Name: ALBUTEROL HFA (PA)]; USE 2 PUFFS BY MOUTH EVERY  6 HOURS AS NEEDED FOR  WHEEZING  Dispense: 34 g; Refill: 0    If protocol passes may refill for 6 months if within 3 months of last provider visit (or a total of 9 months). If patient requesting >1 inhaler per month refill x 6 months and have patient make appointment with provider.

## 2021-06-13 NOTE — PROGRESS NOTES
Assessment & Plan:       1. Acute pain of left shoulder  cyclobenzaprine (FLEXERIL) 5 MG tablet   2. Lateral epicondylitis of right elbow        Medical Decision Making  Patient presents with ongoing left shoulder pain for 2 weeks and acute onset right forearm pain.  Left shoulder pain appears most consistent with AC joint pain versus rotator cuff injury.  This injury does not appear significant as patient still has full range of motion, however the pain is getting worse and she describes a burning sensation occasionally becomes severe.  Due to this worsening pain, recommend she follow-up with some orthopedics for further evaluation of possible significant tendon/muscle tear.  Patient also notes right forearm pain appears most consistent with lateral epicondylitis due to worsening symptoms with resisted pronation.  Patient will assess for range of motion of all right forearm  without difficulty.  Have patient try a muscle x-ray to help with nighttime symptoms.  Otherwise continue with rest, warm compresses, and over-the-counter analgesics.  Discussed signs of worsening symptoms and when to follow-up with PCP if no symptom improvement.    Patient Instructions   You were seen today for a muscle/tendon strain.    Symptom management:  - Take the flexeril to relax the muscle, start with just 5 mg, if you tolerate that alright may take up to 10 mg at a time up to 3 times a day as needed.  - May use acetaminophen 500-1000 mg for first 3 hours (not to exceed 4000 mg in one day), then ibuprofen 400-600 mg with food for the next 3 hours, and alternate as needed  - Heat pads as tolerated  - Rest with occasional light stretching    Reasons to be seen for re-evaluation:  - Develop worsening numbness or tingling in the hand and fingers  - Sharp shooting pain that radiates from the injury  - Worsening swelling or bruising develops  - Reduced strength of muscles  - Loss of bowel or bladder function  - Fever of 100.4 or  "higher    Otherwise, if pain is not improving after 1 week, return for re-evaluation or see your primary care provider.            Subjective:       Estela Ramirez is a 34 y.o. female here for evaluation of left shoulder pain and right forearm pain.  Patient had a fall 2 weeks ago he slipped walking on hardwood floors and landed with her arms across the chest.  She says then 2 to 3 days later she started developing left shoulder pains.  The pain is worse with movement of the left arm up above 90 degrees, and wakes the patient up at night.  She states the pain becomes severe and describes it as a \"burning sensation\".  She denies radiation of the pain down the left arm, numbness, tingling.  Then, over the last 48 hours patient was lifting something heavy when she developed pain over the right forearm.  She now notes worse pain and she is pronating the right hand.  Otherwise no hand, wrist, or elbow pain.  Patient is taking 800 mg ibuprofen with no relief.    The following portions of the patient's history were reviewed and updated as appropriate: allergies, current medications and problem list.    Review of Systems  Pertinent items are noted in HPI.     Allergies  Allergies   Allergen Reactions     Cephalosporins Hives     Other Allergy (See Comments) Itching and Swelling     Other, 09/15/2014.  Reaction: Other  Ham        Family History   Problem Relation Age of Onset     Varicose Veins Mother      Pancreatic cancer Brother      Pancreatic cancer Maternal Grandmother      Throat cancer Maternal Grandfather      Diabetes Father      Asthma Father      Depression Father        Social History     Socioeconomic History     Marital status:      Spouse name: None     Number of children: None     Years of education: None     Highest education level: None   Occupational History     None   Social Needs     Financial resource strain: None     Food insecurity     Worry: None     Inability: None     Transportation needs     " Medical: None     Non-medical: None   Tobacco Use     Smoking status: Current Some Day Smoker     Packs/day: 0.25     Types: Cigarettes     Smokeless tobacco: Never Used   Substance and Sexual Activity     Alcohol use: No     Drug use: No     Sexual activity: Yes     Partners: Male   Lifestyle     Physical activity     Days per week: None     Minutes per session: None     Stress: None   Relationships     Social connections     Talks on phone: None     Gets together: None     Attends Synagogue service: None     Active member of club or organization: None     Attends meetings of clubs or organizations: None     Relationship status: None     Intimate partner violence     Fear of current or ex partner: None     Emotionally abused: None     Physically abused: None     Forced sexual activity: None   Other Topics Concern     None   Social History Narrative     None         Objective:       /76 (Patient Site: Right Arm, Patient Position: Sitting, Cuff Size: Adult Regular)   Pulse 70   Temp 97.7  F (36.5  C) (Oral)   Resp 16   Wt 164 lb (74.4 kg)   LMP 11/16/2020   SpO2 100%   BMI 25.69 kg/m    General appearance: alert, appears stated age, cooperative, no distress and non-toxic  Extremities: Left shoulder: Full range of motion with pain during abduction above 90 degrees, tenderness to palpation over the AC joint and deltoid muscle, no obvious step-off of the AC joint or separation; no signs of trauma or deformity.  Right forearm: Tenderness over the middle ulna; otherwise full range of motion of wrist and elbow without difficulty; pain with resisted pronation  Skin: Skin color, texture, turgor normal. No rashes or lesions  Neurologic: Sensation light touch is intact and symmetrical, muscle strength is intact and symmetrical

## 2021-06-13 NOTE — PROGRESS NOTES
Assessment:        Estela Ramirez is a 31 y.o. female here for postpartum exam at 12 weeks postpartum. Pap smear done at today's visit.   1. Postpartum care and examination  norethindrone (SIMI) 0.35 mg tablet    Gynecologic Cytology (PAP Smear)   2. Asthma  fluticasone-salmeterol (ADVAIR DISKUS) 100-50 mcg/dose DISKUS    albuterol (PROAIR HFA;PROVENTIL HFA;VENTOLIN HFA) 90 mcg/actuation inhaler   3. Contraception management  Pregnancy (Beta-hCG, Qual), Urine   4. Low back pain  MR Lumbar Spine Without Contrast   5. Need for vaccination  Influenza, Seasonal,Quad Inj, 36+ MOS   . .    Plan:        1. Generally doing well. Discussed kegel exercises for her feeling of pressure with running. If not improving she will let me know and we can refer to gynecology for further evaluation and assessment.   2. Contraception: oral progesterone-only contraceptive  3. Is out of her Advair. Will restart this and will f/u if not improving. She is comfortable with this.   4. Will have her get an MRI of her lumbar spine with her known history of issues with low back pain and f/u accordingly.     Subjective:       Estela Ramirez is a 31 y.o. female who presents for a postpartum visit. She is 12 weeks postpartum following a spontaneous vaginal delivery. I have fully reviewed the prenatal and intrapartum course. The delivery was at 40-2 gestational weeks. Outcome: spontaneous vaginal delivery. Postpartum course has been uncomplicated. Baby's course has been uncomplicated. Baby is feeding by breast. Bled for 5 weeks postpartum.  Currently bleeding  no bleeding. Bowel function is normal. Bladder function is normal. Patient has resumed intercourse. Contraception method is abstinence. She did the mini-pill last time and did well. Postpartum depression screening score: 6     She is sore all the time. She is stiff in her lower back, like her tailbone. If she is crouched down she can feel it pull, and it hurts badly. When she runs she feels like  "she is still ripped. It also hurts at times when she coughs as well. Almost like there is something that is going to fall out.     She does take her inhaler a lot as well. She has had a cough since she had her.     The following portions of the patient's history were reviewed and updated as appropriate: allergies, current medications and problem list    Review of Systems  With the exception of the aforementioned issues, 12 point, comprehensive ROS was done and was negative.      Objective:         Vitals:    10/13/17 0805   BP: 98/60   Pulse: 82   Weight: 149 lb (67.6 kg)   Height: 5' 7.5\" (1.715 m)       Physical Exam:  General Appearance: Alert, cooperative, no distress, appears stated age  Head: Normocephalic, without obvious abnormality, atraumatic  Eyes: PERRL, conjunctiva/corneas clear, EOM's intact  Ears: Normal TM's and external ear canals, both ears  Nose: Nares normal, septum midline,mucosa normal, no drainage  Throat: Lips, mucosa, and tongue normal; teeth and gums normal  Neck: Supple, symmetrical, trachea midline, no adenopathy;  thyroid: not enlarged, symmetric, no tenderness/mass/nodules; no carotid bruit or JVD  Back: Symmetric, no curvature, ROM normal, no CVA tenderness  Lungs: Clear to auscultation bilaterally, respirations unlabored  Breasts: No breast masses, tenderness, asymmetry, or nipple discharge.  Heart: Regular rate and rhythm, S1 and S2 normal, no murmur, rub, or gallop,   Abdomen: Soft, non-tender, bowel sounds active all four quadrants,  no masses, no organomegaly  Pelvic:Normally developed genitalia with no external lesions or eruptions. Vagina and cervix show no lesions, inflammation, discharge or tenderness. Mild cystocele, No rectocele. Uterus normal.  No adnexal mass or tenderness.  Extremities: Extremities normal, atraumatic, no cyanosis or edema  Skin: Skin color, texture, turgor normal, no rashes or lesions  Lymph nodes: Cervical, supraclavicular, and axillary nodes " normal  Neurologic: Normal

## 2021-06-13 NOTE — PATIENT INSTRUCTIONS - HE
You were seen today for a muscle/tendon strain.    Symptom management:  - Take the flexeril to relax the muscle, start with just 5 mg, if you tolerate that alright may take up to 10 mg at a time up to 3 times a day as needed.  - May use acetaminophen 500-1000 mg for first 3 hours (not to exceed 4000 mg in one day), then ibuprofen 400-600 mg with food for the next 3 hours, and alternate as needed  - Heat pads as tolerated  - Rest with occasional light stretching    Reasons to be seen for re-evaluation:  - Develop worsening numbness or tingling in the hand and fingers  - Sharp shooting pain that radiates from the injury  - Worsening swelling or bruising develops  - Reduced strength of muscles  - Loss of bowel or bladder function  - Fever of 100.4 or higher    Otherwise, if pain is not improving after 1 week, return for re-evaluation or see your primary care provider.

## 2021-06-13 NOTE — PROGRESS NOTES
Chief Complaint   Patient presents with     Foot Injury     right foot pain started Friday, hx of stress fractures       HPI:      Patient is here for moderate right lateral foot pain since Friday, without injury. She has hx of stress fractures. She is on her feet a lot a work. No fever, redness/swelling of feet.    ROS: Pertinent ROS noted in HPI.     Allergies   Allergen Reactions     Cephalosporins Hives     Other Allergy (See Comments) Itching and Swelling     Other, 09/15/2014.  Reaction: Other  Ham        Patient Active Problem List   Diagnosis     Asthma     Lower Back Pain     Nicotine Dependence     Vitamin D Deficiency     MTHFR mutation     History of superficial phlebitis     Varicose vein of leg      (spontaneous vaginal delivery)       No family history on file.    Social History     Social History     Marital status:      Spouse name: N/A     Number of children: N/A     Years of education: N/A     Occupational History     Not on file.     Social History Main Topics     Smoking status: Current Some Day Smoker     Packs/day: 0.25     Types: Cigarettes     Smokeless tobacco: Never Used     Alcohol use No     Drug use: No     Sexual activity: Yes     Partners: Male     Other Topics Concern     Not on file     Social History Narrative       Objective:    Vitals:    17 0822   BP: 100/62   Pulse: 89   Resp: 12   Temp: 97.6  F (36.4  C)   SpO2: 97%       Gen:NAD  MSK: normal inspection of ankles and feet. Mild pain to palpation at right lateral dorsal foot around the base of 5th metatarsal. Full ROM of ankles and toes bilaterally. Mild limp on right foot.  Neuro: intact and symmetric gross sensation of feet.    XR - no acute bony abnormalities per my interpretation, discussed during visit.        Right foot pain  -     XR Foot Right 3 or More VWS  -     Elastic bandage        Suspect foot sprain. Supportive cares and f/u as directed.

## 2021-06-14 NOTE — PROGRESS NOTES
ASSESSMENT: Estela Ramirez is a 31 y.o. female with past medical history significant for asthma, nicotine dependence, vitamin D deficiency,  MTHFR mutation who presents today for new patient evaluation of a 5 year history of right low back pain with radiation to the right lower extremity which became more severe 5 months ago, after the birth of her daughter.  Patient has a previous history of a left L4-5 microlumbar discectomy on October 14, 2003 and a redo left L4-5 microlumbar discectomy on March 11, 2009.  MRI lumbar spine shows a large L4-5 right paracentral disc herniation which results in severe spinal stenosis and impingement upon the right L5 nerve root.  The patient is status post a right L4-5 transforaminal epidural steroid injection on August 21, 2014 which provided relief of her pain but only lasted 1 week.  The patient has a diminished right patellar reflex but is otherwise neurologically intact on exam.    BEV: 70  WHO 5: 19    PLAN:  A shared decision making model was used.  The patient's values and choices were respected.  The following represents what was discussed and decided upon by the physician assistant and the patient.      1.  DIAGNOSTIC TESTS: Reviewed the MRI lumbar spine.  No further diagnostic tests were ordered.    2.  PHYSICAL THERAPY:  Discussed the importance of core strengthening, ROM, stretching exercises with the patient and how each of these entities is important in decreasing pain.  Explained to the patient that the purpose of physical therapy is to teach the patient a home exercise program.  These exercises need to be performed every day in order to decrease pain and prevent future occurrences of pain.  I placed an order for the patient begin physical therapy.  She is going to check the hours of some local physical therapy facilities so I printed out an order for her.    3.  MEDICATIONS: No changes are made to the patient's medications.  She can continue using Tylenol and  ibuprofen as needed.  She is still exclusively breast-feeding her 5-month-old daughter.  The patient states that she was prescribed methocarbamol after she fell downstairs 1 month ago but she did not fill it because she was concerned that it may not be safe for the infant.  I did reassure her that according to my pharmacopeia methocarbamol is safe to take while breast-feeding, but the patient was still like to avoid it.    4.  INTERVENTIONS: I offered the patient a right L4-5 and L5-S1 transforaminal epidural steroid injection.  I did tell the patient that she would need to talk to her obstetrician before we proceed with the injection.  The patient indicated she does not want an epidural steroid injection.  She states that she has had 2 epidural steroid injections in the past and it did not provide lasting relief, and they were also quite painful.  She would prefer to meet with a surgeon before pursuing interventional pain management.    5.  REFERRALS: I offered the patient a surgical referral based on the large size of her disc herniation and previous history of lumbar spine surgery ×2.  The patient indicated she would like a referral to I placed a referral to [facility] neurosurgery.    6.  FOLLOW-UP:   The patient follow-up with me as needed.  We will await recommendations from neurosurgery.  If she has any questions or concerns, she should not hesitate to call.      SUBJECTIVE:  Estela Ramirez  Is a 31 y.o. female who presents today in consultation at the request of Dr. Robertson for new patient evaluation of low back pain with radiation into the right lower extremity.  The patient has a history of lumbar spine problems.  The patient had a left L4-5 microlumbar discectomy on October 14, 2013 with Dr. Pierre.  She went on to have a redo left L4-5 microlumbar discectomy on March 11, 2009 with Dr. Andrew.  The patient states that she did well after her second surgery until 5 years ago.  At that time she began to  have back pain radiating into the right leg.  She had a right L4-5 transforaminal epidural steroid injection at [facility] on August 21, 2014 which provided some relief of her pain but only lasted 1 week.  The pain persisted and became severe 5 months ago, shortly after the birth of her daughter.  Since then, the pain has gotten progressively more severe she has had associated numbness and tingling.  Her primary care provider ordered an MRI lumbar spine and referred her to our clinic for further evaluation and treatment.    The patient complains of left greater than right low back pain.  The pain extends into the tailbone area.  It radiates down the right buttock and down the right posterior thigh to the back of the knee.  She then has intermittent numbness and tingling which extends down the posterior calf.  She states that occasionally she feels numbness and tingling in the right foot, but she cannot tell me which part of the foot is affected.  The patient's pain is aggravated with being in any one position for too long.  Bending aggravates her pain.  She has been having difficulty getting dressed in the morning.  Lifting and carrying also aggravate her pain.  She has a 3-year-old son in addition to her 5-year-old daughter and carrying them is painful.  The patient states that she has short-term relief with taking a hot shower.  Otherwise, she denies any alleviating factors for her pain.  The patient states that her right leg feels weaker than the left.  She notices this most when she is driving and she has to lift her foot to switch pedals.  She denies any left-sided symptoms.  She denies any loss of bowel or bladder control.  She denies any saddle anesthesia.  She denies any recent fevers, chills, or sweats.    The patient to physical therapy after her second back surgery.  She tried chiropractic treatment about 2 years ago which provided temporary relief of her pain.  As mentioned above, the patient has had 2  surgeries.  She had the epidural steroid injection in 2014.  She states that she had another epidural steroid injection before her first surgery.  The patient reports that both of these injections were very painful and provided only 1 week of relief.  The patient has been using ibuprofen 800 mg as needed for pain.  She also occasionally takes Tylenol.  The patient would like to avoid taking any additional pain relieving medications she is still exclusively breast-feeding her daughter.    Current Outpatient Prescriptions on File Prior to Encounter   Medication Sig Dispense Refill     acetaminophen (TYLENOL) 325 MG tablet Take 650 mg by mouth every 6 (six) hours as needed for pain.       albuterol (PROAIR HFA;PROVENTIL HFA;VENTOLIN HFA) 90 mcg/actuation inhaler Inhale 2 puffs every 6 (six) hours as needed for wheezing. 3 Inhaler 3     cetirizine (ZYRTEC) 10 MG tablet Take 10 mg by mouth daily.       desonide (DESOWEN) 0.05 % cream Apply to affected area 2 times daily 60 g 1     fluticasone (FLONASE) 50 mcg/actuation nasal spray 2 sprays into each nostril 2 times a day at 6:00 am and 4:00 pm. 18 g 11     fluticasone-salmeterol (ADVAIR DISKUS) 100-50 mcg/dose DISKUS Inhale 1 puff 2 (two) times a day. 3 each 3     methocarbamol (ROBAXIN) 500 MG tablet Take 1 tablet (500 mg total) by mouth 3 (three) times a day as needed (pain). 12 tablet 0     norethindrone (SIMI) 0.35 mg tablet Take 1 tablet (0.35 mg total) by mouth daily. 84 tablet 3     PNV#75-iron [id number] 28 mg iron- 800 mcg Cmpk Take 1 tablet by mouth daily. 90 each 3       Allergies   Allergen Reactions     Cephalosporins Hives     Other Allergy (See Comments) Itching and Swelling     Other, 09/15/2014.  Reaction: Other  Ham        Past Medical History:   Diagnosis Date     Asthma      MTHFR mutation       Patient Active Problem List   Diagnosis     Asthma     Lower Back Pain     Nicotine Dependence     Vitamin D Deficiency     MTHFR mutation      History of superficial phlebitis     Varicose vein of leg         Past Surgical History:   Procedure Laterality Date     HEMILAMINOTOMY LUMBAR SPINE Left 10/14/2003    L4-5       Family history: Brother has pancreatic cancer, grandmother had pancreatic cancer, father has diabetes    Social history: The patient is .  She has a son who is almost 4 years old and a daughter who is 5 months old.  She works full-time at by [employer].  She smokes a half pack of cigarettes per day.  She denies alcohol use.  She denies illicit drug use.    ROS: Positive for cough, wheezing, easy bruising, back pain, leg pain.  Specifically negative for bowel/bladder dysfunction, fevers,chills, appetite changes, unexplained weight loss.   Otherwise 13 systems reviewed are negative.  Please see the patient's intake questionnaire from today for details.      OBJECTIVE:  PHYSICAL EXAMINATION:    CONSTITUTIONAL:  Vital signs as above.  No acute distress.  The patient is well nourished and well groomed.  PSYCHIATRIC:  The patient is awake, alert, oriented to person, place, time and answering questions appropriately with clear speech.    HEENT: Normocephalic, atraumatic.  Sclera clear.  Neck is supple.  SKIN:  Skin over the face, bilateral lower extremities, and posterior torso is clean, dry, intact without rashes.    GAIT:  Gait is non-antalgic.  The patient is able to heel and toe walk without significant difficulty.    STANDING EXAMINATION: Tender palpation of the right greater than left lower lumbar paraspinous muscles.  Lumbar flexion severely restricted due to pain.  Lumbar extension mildly restricted.  MUSCLE STRENGTH:  The patient has 5/5 strength for the bilateral hip flexors, knee flexors/extensors, ankle dorsiflexors/plantar flexors, great toe extensors, ankle evertors/invertors.  NEUROLOGICAL: Trace right and 2+ left patellar reflexes, 1+ bilateral Achilles reflexes.   Negative Babinski's bilaterally.  No ankle clonus  bilaterally. Sensation to light touch is intact in the bilateral L4, L5, and S1 dermatomes.  VASCULAR:  2/4 dorsalis pedis pulses bilaterally.  Bilateral lower extremities are warm.  There is no pitting edema of the bilateral lower extremities.  ABDOMINAL:  Soft, non-distended, non-tender throughout all quadrants.  No pulsatile mass palpated in the left lower quadrant.  LYMPH NODES:  No palpable or tender inguinal lymph nodes.  MUSCULOSKELETAL: Straight leg raise is positive on the right to reproduce right leg symptoms.  Straight leg raise on the left reproduces back pain and the patient demonstrates significant guarding.    RESULTS: I reviewed the MRI lumbar spine from [facility] dated November 14, 2017.  At L4-5 there is a right paracentral disc herniation with slight caudal migration which results in severe spinal stenosis.  There is impingement of the right L5 nerve root in the lateral recess.

## 2021-06-14 NOTE — PROGRESS NOTES
Walk In Care Note                                                        Date of Visit: 12/26/2020     Chief Complaint   Estela Ramirez is a(n) 35 y.o. White or  female who presents to [facility] with the following complaint(s):  Pain (left pinky pain -  started today burning, tender to touch)       Assessment and Plan   1. Paronychia of left little finger  - clindamycin (CLEOCIN) 300 MG capsule; Take 1 capsule (300 mg total) by mouth 4 (four) times a day for 10 days. Take with food. Take probiotic while on antibiotic.  Dispense: 40 capsule; Refill: 0      Patient presenting with acute paronychia of the radial aspect of the distal phalanx of the left 5th finger. No signs of abscess or felon. Treating with clindamycin due to listed cephalosporin allergy. Recommended use of a probiotic while taking this antibiotic. Recommended that patient soak the finger in a warm Epsom Salt soak at least two times a day. Discussed use of alternating doses of acetaminophen and ibuprofen as needed for discomfort.     Counseled patient regarding assessment and plan for evaluation and treatment. Questions were answered. See AVS for the specific written instructions and educational handout(s) regarding paronychia that were provided at the conclusion of the visit.     Discussed signs / symptoms that warrant urgent / emergent medical attention.     Follow up within 2 days if not improved.      History of Present Illness   Primary symptom: Pain  Onset: Overnight  Location: Radial edge of the distal phalanx of the left 5th finger  Description: Burning / throbbing  Progression: Persisting  Frequency: Constant  Exacerbating factors: Bending / using the finger and palpating the area.   Relieving factors: None  Associated bruising: No  Associated swelling: Mild  Associated erythema: Yes  Additional symptoms: No drainage from the nail edge. No fevers / chills. No streaking up the hand / arm.   Home therapies utilized: Ibuprofen and  "soaked in hot water in the shower this morning.   Known injury: No  History of similar pain: No  History of surgery in this area: No     Review of Systems   Review of Systems   All other systems reviewed and are negative.       Physical Exam   Vitals:    12/26/20 1210   BP: 127/72   Patient Site: Right Arm   Patient Position: Sitting   Cuff Size: Adult Regular   Pulse: 82   SpO2: 99%   Weight: 164 lb 6.4 oz (74.6 kg)   Height: 5' 7\" (1.702 m)     Physical Exam  Vitals signs and nursing note reviewed.   Constitutional:       General: She is not in acute distress.     Appearance: She is well-developed and normal weight. She is not ill-appearing or toxic-appearing.   Musculoskeletal:      Left hand: She exhibits tenderness (along the radial aspect of the distal phalanx of the 5th finger) and swelling (and erythema along the radial aspect of the distal phalanx of the 5th finger). She exhibits normal range of motion, normal capillary refill, no deformity and no laceration. Normal sensation noted. Normal strength noted.   Skin:     General: Skin is warm and dry.      Capillary Refill: Capillary refill takes less than 2 seconds.      Coloration: Skin is not pale.      Findings: No rash.   Neurological:      General: No focal deficit present.      Mental Status: She is alert and oriented to person, place, and time.      Sensory: Sensation is intact.          Diagnostic Studies   Laboratory:  N/A  Radiology:  N/A  Electrocardiogram:  N/A     Procedure Note   N/A     Pertinent History   The following portions of the patient's history were reviewed and updated as appropriate: allergies, current medications, past family history, past medical history, past social history, past surgical history and problem list.    Patient has Asthma; Lower Back Pain; Nicotine Dependence; Vitamin D Deficiency; MTHFR mutation (H); History of superficial phlebitis; Varicose vein of leg; and Lumbar radiculopathy on their problem list.    Patient has " a past medical history of Asthma and MTHFR mutation (H).    Patient has a past surgical history that includes Hemilaminotomy lumbar spine (Left, 10/14/2003).    Patient's family history includes Asthma in her father; Depression in her father; Diabetes in her father; Pancreatic cancer in her brother and maternal grandmother; Throat cancer in her maternal grandfather; Varicose Veins in her mother.    Patient reports that she has been smoking cigarettes. She has been smoking about 0.25 packs per day. She has never used smokeless tobacco. She reports that she does not drink alcohol or use drugs.     Portions of this note have been dictated using voice recognition software. Any grammatical or contextual distortions are unintentional and inherent to the software.    Eloy Read MD  [facility]

## 2021-06-14 NOTE — PROGRESS NOTES
Neurosurgery consultation was requested by: MACHO Dumont  Pain: Low back stiffness  Radicular Pain is present: Buttock and down right leg just past knee  Lhermitte sign: No   Motor complaints: Weakness in right leg   Sensory complaints: Numbness in right foot and lower leg on and off  Gait and balance issues: Denies   Bowel or bladder issues: Denies   Duration of SX is: 5 years   The symptoms are worse with: Sitting and standing  The symptoms are better with: Walking   Injury: Denies   Severity is:CHronic   Patient has tried the following conservative measures:None   BEV score is:44%  GORDY Rodríguez

## 2021-06-14 NOTE — PROGRESS NOTES
NEUROSURGERY CONSULTATION NOTE    12/10/2017     CHIEF COMPLAINT: Right leg pain    HPI:    Estela Ramirez is a 32 y.o. female who is sent to us in consultation by Martha Shrestha  for evaluation of recurrent disc herniation and right lower extremity pain.      Estela notes that she has a past history of left L4-5 disc herniation with associated left leg pain for which she underwent initial diskectomy in 2001 and when she had subsequent re-herniation and recurrent left leg archana, she had redo left L4-5 microdiskectomy. She states that she healed well from these surgeries with minimal residual symptoms but notes that about 5 years ago, she developed new onset right lower extremity pain which she had never experienced in the past. She had undergone MRI which demonstrated a right sided L4-5 disc herniation with severe compression in the right lateral recess. At that time she had just given birth to her son and was able to undergo surgery. She developed a flair of pain during her next pregnancy during which time she was not a candidate for surgery. Since delivering, she continues to have right leg pain that radiates down the posterior right thigh and stops at the knee. She gets numbness while walking which she feels in the posterior calf and into the whole foot. She denies any weakness. She notes that the pain interferes w her ability to play with and take care of her children. It is starting to interfere with her driving as prolonged sitting aggravates her pain.   For her pain she has tried advil, physical therapy, epidural steroid injections (last was right L4-5 TF SHY 8/21/2014 at [facility]) all of which have offered no benefit for her leg pain. She has seen a chiropractor which she states makes her feel more stiff.     Past Medical History:   Diagnosis Date     Asthma      MTHFR mutation      Past Surgical History:   Procedure Laterality Date     HEMILAMINOTOMY LUMBAR SPINE Left 10/14/2003    L4-5         REVIEW OF  SYSTEMS:  Pt denies any changes in her bowel or bladder habits. She denies any left sided radicular symptoms but does experience some muscular tightness throughout the leg. She does note a past history of itching, concerning for allergic reaction, with an anesthetic pre-medication given prior to her last diskectomy (2009). A full 14 point review of systems was otherwise completed and is negative aside from that mentioned above in the HPI    MEDICATIONS:  Current Outpatient Prescriptions   Medication Sig Dispense Refill     acetaminophen (TYLENOL) 325 MG tablet Take 650 mg by mouth every 6 (six) hours as needed for pain.       albuterol (PROAIR HFA;PROVENTIL HFA;VENTOLIN HFA) 90 mcg/actuation inhaler Inhale 2 puffs every 6 (six) hours as needed for wheezing. 3 Inhaler 3     cetirizine (ZYRTEC) 10 MG tablet Take 10 mg by mouth daily.       fluticasone (FLONASE) 50 mcg/actuation nasal spray 2 sprays into each nostril 2 times a day at 6:00 am and 4:00 pm. 18 g 11     fluticasone-salmeterol (ADVAIR DISKUS) 100-50 mcg/dose DISKUS Inhale 1 puff 2 (two) times a day. 3 each 3     norethindrone (SIMI) 0.35 mg tablet Take 1 tablet (0.35 mg total) by mouth daily. 84 tablet 3     PNV#75-iron [id number] 28 mg iron- 800 mcg Cmpk Take 1 tablet by mouth daily. 90 each 3     desonide (DESOWEN) 0.05 % cream Apply to affected area 2 times daily 60 g 1     No current facility-administered medications for this visit.          ALLERGIES/SENSITIVITIES:     Allergies   Allergen Reactions     Cephalosporins Hives     Other Allergy (See Comments) Itching and Swelling     Other, 09/15/2014.  Reaction: Other  Ham        PERTINENT SOCIAL HISTORY:   Social History     Social History     Marital status:      Spouse name: N/A     Number of children: N/A     Years of education: N/A     Social History Main Topics     Smoking status: Current Some Day Smoker     Packs/day: 0.25     Types: Cigarettes     Smokeless tobacco: Never Used      "Alcohol use No     Drug use: No     Sexual activity: Yes     Partners: Male     Other Topics Concern     None     Social History Narrative         FAMILY HISTORY:  Family History   Problem Relation Age of Onset     Varicose Veins Mother      Pancreatic cancer Brother      Pancreatic cancer Maternal Grandmother      Throat cancer Maternal Grandfather         PHYSICAL EXAM:     Constitution: /60  Pulse 78  Ht 5' 7.5\" (1.715 m)  Wt 151 lb (68.5 kg)  SpO2 98%  BMI 23.3 kg/m2.   Awake, alert and in NAD  Eyes: Conjugate gaze. Conjunctiva benign without icterus or injection  Heart: RRR  Lungs: Non-labored respiration without accessory muscle use  Skin: No obvious rash or lesion  Psych: Appropriate mood and affect, alert and oriented x 3  Mental Status:  Speech is fluent.  Recent and remote memory are intact.  Attention span and concentration are normal.     Cranial Nerves:  CN2: No funduscopic exam performed. CN3,4 & 6: Pupillary light response, lateral and vertical gaze normal.  No nystagmus.   CN7: No facial weakness, smile, facial symmetry intact. CN8: Intact to spoken voice.      Motor: No pronator drift of upper extremity. Normal bulk and tone all muscle groups of upper and lower extremities. Strength is 5/5 in all muscle groups of the bilateral upper and lower extremities     Sensory: Sensation intact bilaterally to light touch and temperature throughout. Sensation is intact to pinprick in the bilateral LE. Intact vibratory sense,     Coordination:  Gait is normal. Patient is able to heel and toe walk.      Reflexes; 2+ supinator, biceps, triceps, knee/ ankle jerk intact. No hoffmans/ toes are down-going in response to plantar stimulation. No clonus    Musculoskeletal:  Negative JENA testing. Negative Dov finger test. Negative straight leg raise    IMAGING: I personally reviewed all radiographic images    MRI lumbar spine 11/14/2017: Evidence of prior L4-5 diskectomy on the left. Large right L4-5 disc " herniation with moderate central canal stenosis, severe lateral recess stenosis    CONSULTATION ASSESSMENT AND PLAN:    Estela Ramirez is a 32yF with a PMH sig for prior left L4-5 disc herniation x 2 with subsequent diskectomy x 2 who has a recurrent L4-5 disc herniation which is new to the right side with a right lumbar radiculopathy    I discussed w Estela that in individuals w recurrent disc herniation, there are several options for treatment including repeat diskectomy vs lumbar fusion. I noted that given her young age, her desire to be active in the care of her infant child at present, I would recommend redo diskectomy but emphasized the risk of re-herniation which she states she understands well.  We discussed the risks of diskectomy including bleeding, infection, injury to surrounding structures including nerve roots, dura w the potential for CSF leak, persistent radicular symptoms post-operatively. We did also review the risks and post-operative course required for lumbar interbody fusion and Estela notes that she is not ready for a lumbar fusion. She would like to proceed w right L4-5 micro diskectomy.    I spent more than 45 minutes in this apt, examining the pt, reviewing the scans, reviewing notes from chart, discussing treatment options with risks and benefits and coordinating care. >50 % clinic time was spent in face to face counseling and coordinating care    Elham Nobles MD      Cc:   Farheen Robertson MD

## 2021-06-15 PROBLEM — I83.90 VARICOSE VEIN OF LEG: Status: ACTIVE | Noted: 2017-02-08

## 2021-06-16 PROBLEM — M54.16 LUMBAR RADICULOPATHY: Status: ACTIVE | Noted: 2018-11-14

## 2021-06-17 NOTE — PATIENT INSTRUCTIONS - HE
Patient Instructions by Jenniffer Flor CNP at 6/21/2019 10:40 AM     Author: Jenniffer Flor CNP Service: -- Author Type: Nurse Practitioner    Filed: 6/21/2019 11:03 AM Encounter Date: 6/21/2019 Status: Signed    : Jenniffer Flor CNP (Nurse Practitioner)           Patient Education     Gallstones with Biliary Colic    You have abdominal pain due to irritation and spasm of the gallbladder. This is called biliary colic. The gallbladder is a small sac under the liver, which stores and releases a fluid that aids in the digestion of fat. A collection of crystals may form stones inside the gallbladder (gallstones). Gallstones can cause the gallbladder to spasm. If they block the duct out of the gallbladder, they can cause pain and even an infection.   A number of factors increase the risk for having gallstones:    Being female    Being severely overweight (obese)    Older age    Losing or gaining weight quickly    Eating a high-calorie diet    Being pregnant    Taking hormone therapy    Having diabetes  Home care    Rest in bed.    Drink only clear liquids until you feel better.    You may have been prescribed medicine for pain or nausea. Take these as directed.    Fat in your diet makes the gallbladder contract and may cause increased pain. Avoid foods that are high in fat (such as full-fat dairy, fried foods, and fatty meats) for at least two days.    If you are overweight, talk to your healthcare provider about losing weight.  Follow-up care  Follow up with your healthcare provider or as advised. There is a chance that you will have another episode of pain from your gallstones at some point. Removal of the gallbladder is an option to prevent this. Talk with your healthcare provider about your treatment options.  When to seek medical advice  Call your healthcare provider if any of the following occur:    Worsening pain or pain lasting for longer than 6 hours    Pain moving to the right lower  abdomen    Repeated vomiting    Swollen abdomen    Fever of 100.4 F (38 C) or higher, or as directed by your healthcare provider    Very dark urine, light colored stools, or yellow color of the skin or eyes    Chest, arm, back, neck or jaw pain  Date Last Reviewed: 6/18/2015 2000-2017 [employer]. [address] [place] [id number]. All rights reserved. This information is not intended as a substitute for professional medical care. Always follow your healthcare professional's instructions.

## 2021-06-19 NOTE — PROGRESS NOTES
ASSESSMENT/PLAN:       1. Right-sided thoracic back pain  -The patient does have point tenderness over her mid thoracic spine.  Given the persistence of her pain, normal x-ray done last fall I will refer her for an MRI of her thoracic spine for further evaluation and assessment.  In the meantime continue with heat and ice as needed as well as Tylenol and ibuprofen.  She does not have any obvious radicular signs at this time so would be more worried about a bony injury.  - MR Thoracic Spine Without Contrast; Future    2. Right wrist pain  -X-ray to me shows mildly increased joint space between the ulnar styloid and wrist bones.  Continue bracing for now, await formal radiology x-ray read.  If indicated consider referral to orthopedics for discussion of possible ligamentous injury.  - XR Wrist Right 3 or More VWS; Future      Farheen Robertson MD      PROGRESS NOTE   8/8/2018    SUBJECTIVE:  Estela Ramirez is a 32 y.o. female  who presents for follow up.     She does still have back pain. She has one point tender spot on her back and she feels more stiff. This is higher up. She never had issues with her upper back until she fell. She will crack it at times and then it feels like fire. For the pain she has been taking ibuprofen, doesn't really help. She has no weakness in her legs.     She has pain on the right hand over the wrist. She will wear a brace at work and this does help some. She has more pain with twisting. Over the ulnar side, near the they styloid. It will come up into her hand at times if she is really using it.   Chief Complaint   Patient presents with     Follow-up     Patient fell down the stairs in Oct of 2017, was told it was just a muscle strain. Patient is still having back pain.      Wrist Pain     Patient has been having right hand pain with motion for the past two months, no known injury.          Patient Active Problem List   Diagnosis     Asthma     Lower Back Pain     Nicotine  Dependence     Vitamin D Deficiency     MTHFR mutation (H)     History of superficial phlebitis     Varicose vein of leg       Current Outpatient Prescriptions   Medication Sig Dispense Refill     acetaminophen (TYLENOL) 325 MG tablet Take 650 mg by mouth every 6 (six) hours as needed for pain.       albuterol (PROAIR HFA;PROVENTIL HFA;VENTOLIN HFA) 90 mcg/actuation inhaler Inhale 2 puffs every 6 (six) hours as needed for wheezing. 3 Inhaler 3     cetirizine (ZYRTEC) 10 MG tablet Take 10 mg by mouth daily.       fluticasone (FLONASE) 50 mcg/actuation nasal spray 2 sprays into each nostril 2 times a day at 6:00 am and 4:00 pm. 18 g 11     fluticasone-salmeterol (ADVAIR DISKUS) 100-50 mcg/dose DISKUS Inhale 1 puff 2 (two) times a day. 3 each 3     norethindrone (SIMI) 0.35 mg tablet Take 1 tablet (0.35 mg total) by mouth daily. 84 tablet 3     No current facility-administered medications for this visit.        History   Smoking Status     Current Some Day Smoker     Packs/day: 0.25     Types: Cigarettes   Smokeless Tobacco     Never Used           OBJECTIVE:        No results found for this or any previous visit (from the past 240 hour(s)).    Vitals:    08/08/18 0950   BP: 102/66   Patient Site: Right Arm   Patient Position: Sitting   Cuff Size: Adult Regular   Pulse: 75   SpO2: 98%   Weight: 146 lb 8 oz (66.5 kg)     Weight: 146 lb 8 oz (66.5 kg)        Physical Exam:  GENERAL APPEARANCE: A&A, NAD, well hydrated, well nourished  SKIN:  Normal skin turgor, no lesions/rashes   HEENT: moist mucous membranes, no rhinorrhea  NECK: Normal  EXTREMITY: no edema, right wrist shows an enlarged ulnar styloid compared to the left, she does have tenderness to palpation over the styloid as well, normal range of motion without exacerbation of the pain with the exception of supination  NEURO: no gross deficits   Back: Pain to palpation over the right spine mid thoracic spine.       Wrist x-ray to me shows an enlarged joint space  on the ulnar aspect of the wrist without any obvious broken bones

## 2021-06-21 NOTE — PROGRESS NOTES
Subjective:      Patient ID: Estela Ramirez is a 32 y.o. female with PMHx of L4-L5 disc bulging.    Chief Complaint:    Back Pain   This is a new problem. The current episode started yesterday. The problem occurs constantly. The problem is unchanged. The pain is present in the lumbar spine (LEFT). The quality of the pain is described as aching. The pain radiates to the left knee. The symptoms are aggravated by lying down, position and sitting (movement). Associated symptoms include bladder incontinence and weakness (left leg). Pertinent negatives include no bowel incontinence, dysuria, numbness or tingling. She has tried walking and NSAIDs for the symptoms. The treatment provided no relief.       Past Surgical History:   Procedure Laterality Date     HEMILAMINOTOMY LUMBAR SPINE Left 10/14/2003    L4-5       Social History     Tobacco Use     Smoking status: Current Some Day Smoker     Packs/day: 0.25     Types: Cigarettes     Smokeless tobacco: Never Used   Substance Use Topics     Alcohol use: No     Drug use: No       Review of Systems   Gastrointestinal: Negative for bowel incontinence.   Genitourinary: Positive for bladder incontinence. Negative for dysuria.   Musculoskeletal: Positive for back pain.   Neurological: Positive for weakness (left leg). Negative for tingling and numbness.       Objective:     /62 (Patient Site: Right Arm, Patient Position: Sitting, Cuff Size: Adult Regular)   Pulse 80   Temp 97.9  F (36.6  C) (Oral)   Resp 18   Wt 150 lb (68 kg)   SpO2 99%   Breastfeeding? Yes   BMI 23.15 kg/m      Physical Exam   Constitutional: She appears well-developed and well-nourished. No distress.   HENT:   Head: Normocephalic and atraumatic.   Right Ear: External ear normal.   Left Ear: External ear normal.   Eyes: Conjunctivae are normal.   Cardiovascular: Normal rate, regular rhythm and normal heart sounds. Exam reveals no gallop and no friction rub.   No murmur heard.  Pulmonary/Chest: Effort  normal and breath sounds normal. No respiratory distress. She has no wheezes. She has no rales.   Abdominal: Soft. There is no CVA tenderness.   Musculoskeletal:        Lumbar back: She exhibits decreased range of motion, pain and spasm. She exhibits no tenderness, no bony tenderness, no swelling, no edema, no deformity and no laceration.   Skin: She is not diaphoretic.   Psychiatric: She has a normal mood and affect. Her behavior is normal. Judgment and thought content normal.   Nursing note and vitals reviewed.    Clinical Decision Making:  Considering the present of a sudden back pain with her hx of bulging disc and presence of urinary incontinence, I recommended that the patient be seen in the ED for rule out of cauda equina syndrome. Report was given to the [facility] ED provider prior to the patients arrival. The patient went by private vehicle driven by her mother.     Assessment:     Procedures    1. Urinary incontinence, unspecified type     2. Acute left-sided low back pain with left-sided sciatica           Patient Instructions   Complete workup at [facility] ED.

## 2021-06-21 NOTE — PROGRESS NOTES
CHART NOTE     DATE OF SERVICE:  2018     : 1985   32 y.o.     ASSESSMENT :   Left Low back pain with LLE pain in L4 distribution.  No deficits, Likely flare due to bending, no new surgical lesion at this point but small protrusion at L [id number]-[id number] benefit from L L34 SHY and MED X.  Have concerns re instability will check dynamic imaging.      PLAN: F/E imaging today. Medrol dose fred, discontinue the Decadron.  Recommend no work until at least 2018 to relax and recover from this flare. Will call with results of F/E imaging and with plan after discussion with Dr. Nobles. If stable on F/E will refer back to Martha Shrestha for conservative therapy, MED X, injection as needed.     ADDENDUM: No instability on F/E imaging.     HPI:  Estela Ramirez was initially seen inconsultation by Dr. James Nobles on 2017, referred by MACHO Dumont at [facility]  for evaluation of recurrent disc herniation and right lower extremity pain.      Patient has a past history of left L4-5 disc herniation with associated left leg pain with initial diskectomy in , subsequent re-herniation , w/ redo diskectomy for her leg pain.  MRI lumbar spine 2017: Evidence of prior L4-5 diskectomy on the left. Large right L4-5 disc herniation with moderate central canal stenosis, severe lateral recess stenosis.  Dr. Nobles reviewed options  for treatment including repeat diskectomy vs lumbar fusion. Given her young age, desire to be active in the care of her infant child, Dr. Nobles recommended redo diskectomy but emphasized the risk of re-herniation. Patient wanted to avoid fusion and opted to proceed w right L4-5 micro diskectomy. Patient did not follow through with scheduling surgery nor return to our clinic.    Yesterday patient was seen in ED for c/o  sudden onset lower, left-sided back pain since yesterday. The patient bent over yesterday and immediately felt the pain in her back,  stated that this pain is different  than the pain she has experienced in the past. Her pain radiates into her left leg. Reportedly had episode of incontinence but post void bladder scans were wnl and rectal tone intact to exam. Patient denied saddle anesthesia.  She was dc'd on Decadron, Baclofen and advised to be seen in clinic.      TODAY, patient is in for post ED follow up.  She reports that she decided not to have surgery due to having just had a baby. Since seeing Dr. Nobles in 11/2017, her back and leg pain actually subsided and did not recur until the event of bending over yesterday.  Today left back feels stiff and sore, painful with valsalva maneuvers, provoked with up/down motion such as getting out of a chair. Pain starts Left LB and sometimes aches back of leg and crosses in front to knee.  No N/T.  LLE is weak. No bowel or bladder issues.  No saddle anesthesia.  Tried the Decadron from the ED did not like how it made her feel and she is still breastfeeding. Works retail, lots of walking and bending.      PAST MEDICAL HISTORY, SURGICAL HISTORY, REVIEW OF SYMPTOMS, MEDICATIONS AND ALLERGIES:  Past medical history, surgical history, ROS, medications and allergies reviewed with patient and remain unchanged from previous visit, except as noted in HPI.     Past Medical History:   Diagnosis Date     Asthma      MTHFR mutation (H)        Past Surgical History:   Procedure Laterality Date     HEMILAMINOTOMY LUMBAR SPINE Left 10/14/2003    L4-5       Current Outpatient Medications   Medication Sig Dispense Refill     acetaminophen (TYLENOL) 325 MG tablet Take 650 mg by mouth every 6 (six) hours as needed for pain.       albuterol (PROAIR HFA;PROVENTIL HFA;VENTOLIN HFA) 90 mcg/actuation inhaler Inhale 2 puffs every 6 (six) hours as needed for wheezing. 3 Inhaler 3     baclofen (LIORESAL) 10 MG tablet Take 1 tablet (10 mg total) by mouth 3 (three) times a day for 5 days. 15 tablet 0     cetirizine (ZYRTEC) 10 MG tablet Take 10 mg by mouth daily.    "    dexamethasone (DECADRON) 4 MG tablet Take 4 mg by mouth 3 (three) times a day with meals for 5 days. 15 tablet 0     famotidine (PEPCID) 20 MG tablet Take 1 tablet (20 mg total) by mouth 2 (two) times a day for 5 days. 10 tablet 0     fluticasone (FLONASE) 50 mcg/actuation nasal spray 2 sprays into each nostril 2 times a day at 6:00 am and 4:00 pm. 18 g 11     fluticasone-salmeterol (ADVAIR DISKUS) 100-50 mcg/dose DISKUS Inhale 1 puff 2 (two) times a day. 3 each 3     norethindrone (SIMI) 0.35 mg tablet Take 1 tablet (0.35 mg total) by mouth daily. 84 tablet 3     No current facility-administered medications for this visit.        PHYSICAL EXAM:    BP 99/66   Pulse 89   Ht 5' 7\" (1.702 m)   Wt 149 lb 1.6 oz (67.6 kg)   SpO2 100%   BMI 23.35 kg/m        Neurological exam reveals:  Respirations easy, non-labored.   Skin: W/D/I. No rashes, lesions or breaks in integrity.   Recent and remote memory intact, fund of knowledge wnl.    Alert and oriented x3, speech fluent and appropriate.   PERRL, EOMI, No nystagmus,   Face symmetric, tongue midline, Uvula midline,  palate rises with phonation   Shoulder shrug equal  Arm strength bilateral grasp, biceps, triceps, and deltoids 5/5   Leg strength bilateral dorsiflexion, plantar flexion, and hip flexion 5/5  No extremity edema noted.   Can heel/toe walk, do toe rises and squats without difficulty.   Non tender to palp over L SI and L trochanter  Negative SLR bilaterally  Sensation intact to light touch bilaterally.  Muscle Bulk and tone wnl.   Reflexes: No pathological reflexes   Gait and station:Antalgic getting up from chair, improves with walking  NDI/BEV: 58%     RADIOGRAPHIC IMAGING: MRI 11/13/2018:     1.  Postoperative appearance of right L4-L5 laminotomy and discectomy. Small residual central disc extrusion with minimal caudal migration.  2.  At L3-L4, a small left subarticular disc protrusion contacts the descending left L4 nerve roots.3.  At L4-L5, diffuse " disc bulge, ligamentum flavum thickening, and facet arthropathy contribute to mild central spinal canal stenosis, moderate right and mild left subarticular zone narrowing, mild to moderate right neural foraminal stenosis, and mild left neural foraminal stenosis.4.  No additional level of significant spinal canal or neural foraminal stenosis.     F/E today: Straightening of the normal lumbar lordosis. The vertebral bodies of the lumbar spine otherwise have normal stature and alignment. No evidence of dynamic instability on flexion or extension. Mild disc height loss at L3/L4 and L4/L5. The remaining disc spaces are otherwise well-maintained. Soft tissues unremarkable.      Films personally reviewed and interpreted.  Also reviewed and discussed with Dr. Nobles.   Reviewed imaging with patient and family.

## 2021-06-25 NOTE — PROGRESS NOTES
Progress Notes by Butch White MD at 10/29/2017  1:40 PM     Author: Butch White MD Service: -- Author Type: Physician    Filed: 10/29/2017  2:07 PM Encounter Date: 10/29/2017 Status: Signed    : Butch White MD (Physician)       Chief Complaint   Patient presents with   ? Back Pain     fell down stairs a few days ago and having middle and upper right sided pain        History of Present Illness: Nursing notes reviewed.   Patient reports that she is laid on the stairs while going down the stairs and fell 3 days ago she landed on her upper back.  She now complains of right upper back pain.  Pain is located between her scapula.  She denies any loss of consciousness with the fall.  She does not remember injuring her head.  She states she works at [employer] and her work involves lifting in this makes pain worse.  She denies any associated tingling or numbness.  Review of systems: See history of present illness, otherwise negative.     Current Outpatient Prescriptions   Medication Sig Dispense Refill   ? acetaminophen (TYLENOL) 325 MG tablet Take 650 mg by mouth every 6 (six) hours as needed for pain.     ? albuterol (PROAIR HFA;PROVENTIL HFA;VENTOLIN HFA) 90 mcg/actuation inhaler Inhale 2 puffs every 6 (six) hours as needed for wheezing. 3 Inhaler 3   ? cetirizine (ZYRTEC) 10 MG tablet Take 10 mg by mouth daily.     ? fluticasone (FLONASE) 50 mcg/actuation nasal spray 2 sprays into each nostril 2 times a day at 6:00 am and 4:00 pm. 18 g 11   ? fluticasone-salmeterol (ADVAIR DISKUS) 100-50 mcg/dose DISKUS Inhale 1 puff 2 (two) times a day. 3 each 3   ? norethindrone (SIMI) 0.35 mg tablet Take 1 tablet (0.35 mg total) by mouth daily. 84 tablet 3   ? PNV#75-iron fum-[id number]-epa 28 mg iron- 800 mcg Cmpk Take 1 tablet by mouth daily. 90 each 3   ? desonide (DESOWEN) 0.05 % cream Apply to affected area 2 times daily 60 g 1     No current facility-administered medications for this visit.         Past Medical History:   Diagnosis Date   ? Asthma    ? MTHFR mutation       Past Surgical History:   Procedure Laterality Date   ? HEMILAMINOTOMY LUMBAR SPINE Left 10/14/2003    L4-5      Social History     Social History   ? Marital status:      Spouse name: N/A   ? Number of children: N/A   ? Years of education: N/A     Social History Main Topics   ? Smoking status: Current Some Day Smoker     Packs/day: 0.25     Types: Cigarettes   ? Smokeless tobacco: Never Used   ? Alcohol use No   ? Drug use: No   ? Sexual activity: Yes     Partners: Male     Other Topics Concern   ? None     Social History Narrative       History   Smoking Status   ? Current Some Day Smoker   ? Packs/day: 0.25   ? Types: Cigarettes   Smokeless Tobacco   ? Never Used      Exam:   Blood pressure 114/70, pulse 88, temperature 97.7  F (36.5  C), temperature source Oral, resp. rate 16, weight 151 lb 6 oz (68.7 kg), SpO2 98 %, currently breastfeeding.    EXAM:   General: Well-groomed well-developed adult female in no acute distress.  Head appears normocephalic atraumatic eyes pupils appear equal round and reactive to light conjunctiva is moist and pink sclera anicteric.  Lungs she has a normal respiratory effort and auscultation breath sounds are clear.  Heart she has a good S1 and S2 no murmurs noted no JVD noted.  Muscular skeletal she has tenderness on the back muscle around right scapula.  She has good range of motion in the right upper extremity.  Good muscle tone.  No obvious lower extremity edema.  Skin on inspection no obvious rashes noted she is warm to touch skin turgor appear normal.     No results found for this or any previous visit (from the past 24 hour(s)).     Assessment/Plan   1. Muscle strain of right upper back, initial encounter     May use ibuprofen alternate with Tylenol for any discomfort or pain.  I offered patient muscle relaxant but she declined.  Continue activity as tolerated.  Patient Instructions        Self-Care for Strains and Sprains  Most minor strains and sprains can be treated with self-care. Recovering from a strain or sprain may take 6 to 8 weeks. Your self-care goal is to reduce pain and immobilize the injury to speed healing.     A sprain injures ligaments (tissue that connects bones to bones).        A strain injures muscles or tendons (tissue that connects muscles to bones).   Support the injured area  Wrapping the injured area provides support for short, necessary activities. Be careful not to wrap the area too tightly. This could cut off the blood supply.    Support a wrist, elbow, or shoulder with a sling.    Wrap an ankle or knee with an elastic bandage.    Tape a finger or toe to the one next to it.  Use cold and heat  Cold reduces swelling. Both cold and heat reduce pain. Heat should not be used in the initial treatment of the injury. When using cold or heat, always place a towel between the pack and your skin.    Apply ice or a cold pack 10 to 15 minutes every hour youre awake for the first 2 days.    After the swelling goes down, use cold or heat to control pain. Dont use heat late in the day, since it can cause swelling when youre not active.  Rest and elevate  Rest and elevation help your injury heal faster.    Raise the injured area above your heart level.    Keep the injured area from moving.    Limit the use of the joint or limb.  Use medicine    Aspirin reduces pain and swelling. (Note: Dont give aspirin to a child 18 or younger unless prescribed by the doctor.)    Aspirin substitutes, such as ibuprofen, can reduce pain. Some substitutes reduce swelling, too. Ask your pharmacist which substitutes you can use.  Call your doctor if:    The injured joint wont move, or bones make a grating sound when they move.    You cant put weight on the injured area, even after 24 hours.    The injured body part is cold, blue, or numb.    The joint or limb appears bent or crooked.    Pain increases  or doesnt improve in 4 days.    When pressing along the injured area, you notice a spot that is especially painful.   Date Last Reviewed: 9/29/2015 2000-2016 [employer]. [address] [place] [address], [place] [address]. All rights reserved. This information is not intended as a substitute for professional medical care. Always follow your healthcare professional's instructions.           Butch White MD

## 2021-06-27 NOTE — PROGRESS NOTES
Progress Notes by Jenniffer Flor CNP at 6/21/2019 10:40 AM     Author: Jenniffer Flor CNP Service: -- Author Type: Nurse Practitioner    Filed: 6/22/2019  8:22 AM Encounter Date: 6/21/2019 Status: Signed    : Jenniffer Flor CNP (Nurse Practitioner)       Chief Complaint   Patient presents with   ? Abdominal tenderness     2 days       ASSESSMENT & PLAN:   Diagnoses and all orders for this visit:    RUQ abdominal pain  -     Comprehensive Metabolic Panel  -     HM1(CBC and Differential)  -     Lipase  -     Pregnancy (Beta-hCG, Qual), Urine  -     HM1 (CBC with Diff)        MDM:  Will call or send labs to [facility]   Given general info on GB disease including worsening with meals/fat, patient to watch for this.     Can try antacid.       Labs all came back normal later in the day-as promised, patient sent my chart message.  Can follow-up if antacid not effective and sx continue.     Supportive care discussed.  See discharge instructions below for specific recommendations given.    At the end of the encounter, I discussed results, diagnosis, medications. Discussed red flags for immediate return to clinic/ER, as well as indications for follow up if no improvement. Patient and/or caregiver understood and agreed to plan. Patient was stable for discharge.    SUBJECTIVE    HPI:  HPI  Estela Ramirez presents to the walk-in clinic with abdominal pain, RUQ, for two days.  Started with nausea.  No vomiting.  Became concerned when area was tender today.  Denies any new exercise, obvious worsening with eating, black or bloody stool, heartburn.    See ROS for additional symptoms and/or pertinent negatives.       History obtained from the patient.    Past Medical History:   Diagnosis Date   ? Asthma    ? MTHFR mutation (H)        Active Ambulatory (Non-Hospital) Problems    Diagnosis   ? Lumbar radiculopathy   ? Varicose vein of leg   ? MTHFR mutation (H)   ? History of superficial phlebitis   ? Vitamin D Deficiency   ?  Asthma   ? Lower Back Pain   ? Nicotine Dependence         Social History     Tobacco Use   ? Smoking status: Current Some Day Smoker     Packs/day: 0.25     Types: Cigarettes   ? Smokeless tobacco: Never Used   Substance Use Topics   ? Alcohol use: No       Review of Systems   Constitutional: Negative for appetite change, chills and fever.   Gastrointestinal: Positive for abdominal pain and nausea. Negative for blood in stool, constipation (LBM this morning), diarrhea and vomiting.   Genitourinary: Negative for dysuria.        No missed periods    Musculoskeletal: Positive for back pain (low back with h/o surgery  ).   Skin: Negative for rash.       OBJECTIVE    Vitals:    06/21/19 1036   BP: 108/72   Patient Site: Right Arm   Patient Position: Sitting   Cuff Size: Adult Regular   Pulse: 77   Resp: 17   Temp: 98  F (36.7  C)   SpO2: 99%   Weight: 149 lb (67.6 kg)       Physical Exam   Constitutional: She is oriented to person, place, and time. She appears well-developed and well-nourished. No distress.   HENT:   Right Ear: External ear normal.   Left Ear: External ear normal.   Eyes: Conjunctivae are normal. Right eye exhibits no discharge. Left eye exhibits no discharge.   Cardiovascular: Intact distal pulses.   Pulmonary/Chest: Effort normal.   Abdominal: Soft. Bowel sounds are normal. She exhibits no distension and no mass. There is tenderness (RUQ + Stroud's sign and RLQ with neg obturator and psoas signs ). There is no rebound.   Musculoskeletal: Normal range of motion.   Neurological: She is alert and oriented to person, place, and time.   Skin: Skin is warm and dry. Capillary refill takes less than 2 seconds.   Psychiatric: She has a normal mood and affect. Her behavior is normal. Judgment and thought content normal.         Labs:  No results found for this or any previous visit (from the past 240 hour(s)).      Radiology:        PATIENT INSTRUCTIONS:   Patient Instructions       Patient Education      Gallstones with Biliary Colic    You have abdominal pain due to irritation and spasm of the gallbladder. This is called biliary colic. The gallbladder is a small sac under the liver, which stores and releases a fluid that aids in the digestion of fat. A collection of crystals may form stones inside the gallbladder (gallstones). Gallstones can cause the gallbladder to spasm. If they block the duct out of the gallbladder, they can cause pain and even an infection.   A number of factors increase the risk for having gallstones:    Being female    Being severely overweight (obese)    Older age    Losing or gaining weight quickly    Eating a high-calorie diet    Being pregnant    Taking hormone therapy    Having diabetes  Home care    Rest in bed.    Drink only clear liquids until you feel better.    You may have been prescribed medicine for pain or nausea. Take these as directed.    Fat in your diet makes the gallbladder contract and may cause increased pain. Avoid foods that are high in fat (such as full-fat dairy, fried foods, and fatty meats) for at least two days.    If you are overweight, talk to your healthcare provider about losing weight.  Follow-up care  Follow up with your healthcare provider or as advised. There is a chance that you will have another episode of pain from your gallstones at some point. Removal of the gallbladder is an option to prevent this. Talk with your healthcare provider about your treatment options.  When to seek medical advice  Call your healthcare provider if any of the following occur:    Worsening pain or pain lasting for longer than 6 hours    Pain moving to the right lower abdomen    Repeated vomiting    Swollen abdomen    Fever of 100.4 F (38 C) or higher, or as directed by your healthcare provider    Very dark urine, light colored stools, or yellow color of the skin or eyes    Chest, arm, back, neck or jaw pain  Date Last Reviewed: 6/18/2015 2000-2017 [employer],  [facility]. [address] [place] [id number]. All rights reserved. This information is not intended as a substitute for professional medical care. Always follow your healthcare professional's instructions.

## 2021-07-03 NOTE — ADDENDUM NOTE
Addendum Note by Farheen Adams MD at 10/29/2019  9:20 AM     Author: Farheen Adams MD Service: -- Author Type: Physician    Filed: 10/30/2019  1:47 PM Encounter Date: 10/29/2019 Status: Signed    : Farheen Adams MD (Physician)    Addended by: FARHEEN ADAMS on: 10/30/2019 01:47 PM        Modules accepted: Orders

## 2021-07-03 NOTE — ADDENDUM NOTE
Addendum Note by Nany Roe CNP at 11/14/2018  9:00 AM     Author: Nany Roe CNP Service: -- Author Type: Nurse Practitioner    Filed: 11/14/2018 10:53 AM Encounter Date: 11/14/2018 Status: Signed    : Nany Roe CNP (Nurse Practitioner)    Addended by: NANY ROE on: 11/14/2018 10:53 AM        Modules accepted: Orders         Several attempts by various nurses were made to insert #20 IV in [facility] for CT of abd, unsuccessful. Dr Gely Germain notified and Becky Stern in [employer] also notified.

## 2021-07-14 PROBLEM — Z34.82 ENCOUNTER FOR SUPERVISION OF OTHER NORMAL PREGNANCY IN SECOND TRIMESTER: Status: RESOLVED | Noted: 2017-03-08 | Resolved: 2017-07-01

## 2021-07-14 PROBLEM — Z34.90 PREGNANT: Status: RESOLVED | Noted: 2017-06-29 | Resolved: 2017-07-01

## 2021-08-15 ENCOUNTER — HEALTH MAINTENANCE LETTER (OUTPATIENT)
Age: 36
End: 2021-08-15

## 2021-08-19 ENCOUNTER — TRANSFERRED RECORDS (OUTPATIENT)
Dept: HEALTH INFORMATION MANAGEMENT | Facility: CLINIC | Age: 36
End: 2021-08-19

## 2021-10-11 ENCOUNTER — HEALTH MAINTENANCE LETTER (OUTPATIENT)
Age: 36
End: 2021-10-11

## 2022-03-02 ENCOUNTER — OFFICE VISIT (OUTPATIENT)
Dept: FAMILY MEDICINE | Facility: CLINIC | Age: 37
End: 2022-03-02
Payer: COMMERCIAL

## 2022-03-02 VITALS
OXYGEN SATURATION: 99 % | DIASTOLIC BLOOD PRESSURE: 61 MMHG | WEIGHT: 163 LBS | HEART RATE: 73 BPM | HEIGHT: 67 IN | BODY MASS INDEX: 25.58 KG/M2 | SYSTOLIC BLOOD PRESSURE: 102 MMHG

## 2022-03-02 DIAGNOSIS — R41.3 MEMORY PROBLEM: ICD-10-CM

## 2022-03-02 DIAGNOSIS — Z11.59 NEED FOR HEPATITIS C SCREENING TEST: ICD-10-CM

## 2022-03-02 DIAGNOSIS — Z00.00 ENCOUNTER FOR ROUTINE HISTORY AND PHYSICAL EXAMINATION OF ADULT: Primary | ICD-10-CM

## 2022-03-02 DIAGNOSIS — R45.4 IRRITABILITY: ICD-10-CM

## 2022-03-02 DIAGNOSIS — N94.0 MITTELSCHMERZ: ICD-10-CM

## 2022-03-02 DIAGNOSIS — Z23 IMMUNIZATION DUE: ICD-10-CM

## 2022-03-02 DIAGNOSIS — Z12.4 CERVICAL CANCER SCREENING: ICD-10-CM

## 2022-03-02 DIAGNOSIS — Z13.220 LIPID SCREENING: ICD-10-CM

## 2022-03-02 DIAGNOSIS — E55.9 VITAMIN D DEFICIENCY: ICD-10-CM

## 2022-03-02 PROCEDURE — 82306 VITAMIN D 25 HYDROXY: CPT | Performed by: FAMILY MEDICINE

## 2022-03-02 PROCEDURE — 82607 VITAMIN B-12: CPT | Performed by: FAMILY MEDICINE

## 2022-03-02 PROCEDURE — 0054A COVID-19,PF,PFIZER (12+ YRS): CPT | Performed by: FAMILY MEDICINE

## 2022-03-02 PROCEDURE — 90732 PPSV23 VACC 2 YRS+ SUBQ/IM: CPT | Performed by: FAMILY MEDICINE

## 2022-03-02 PROCEDURE — 84443 ASSAY THYROID STIM HORMONE: CPT | Performed by: FAMILY MEDICINE

## 2022-03-02 PROCEDURE — 86803 HEPATITIS C AB TEST: CPT | Performed by: FAMILY MEDICINE

## 2022-03-02 PROCEDURE — 99214 OFFICE O/P EST MOD 30 MIN: CPT | Mod: 25 | Performed by: FAMILY MEDICINE

## 2022-03-02 PROCEDURE — 91305 COVID-19,PF,PFIZER (12+ YRS): CPT | Performed by: FAMILY MEDICINE

## 2022-03-02 PROCEDURE — 80053 COMPREHEN METABOLIC PANEL: CPT | Performed by: FAMILY MEDICINE

## 2022-03-02 PROCEDURE — G0123 SCREEN CERV/VAG THIN LAYER: HCPCS | Performed by: FAMILY MEDICINE

## 2022-03-02 PROCEDURE — 80061 LIPID PANEL: CPT | Performed by: FAMILY MEDICINE

## 2022-03-02 PROCEDURE — 87624 HPV HI-RISK TYP POOLED RSLT: CPT | Performed by: FAMILY MEDICINE

## 2022-03-02 PROCEDURE — 99395 PREV VISIT EST AGE 18-39: CPT | Mod: 25 | Performed by: FAMILY MEDICINE

## 2022-03-02 PROCEDURE — 90471 IMMUNIZATION ADMIN: CPT | Performed by: FAMILY MEDICINE

## 2022-03-02 PROCEDURE — 36415 COLL VENOUS BLD VENIPUNCTURE: CPT | Performed by: FAMILY MEDICINE

## 2022-03-02 RX ORDER — SERTRALINE HYDROCHLORIDE 25 MG/1
25 TABLET, FILM COATED ORAL DAILY
Qty: 30 TABLET | Refills: 3 | Status: SHIPPED | OUTPATIENT
Start: 2022-03-02 | End: 2022-04-15

## 2022-03-02 RX ORDER — ACETAMINOPHEN AND CODEINE PHOSPHATE 120; 12 MG/5ML; MG/5ML
0.35 SOLUTION ORAL DAILY
Qty: 84 TABLET | Refills: 3 | Status: SHIPPED | OUTPATIENT
Start: 2022-03-02 | End: 2024-01-17

## 2022-03-02 ASSESSMENT — ASTHMA QUESTIONNAIRES: ACT_TOTALSCORE: 19

## 2022-03-02 NOTE — PROGRESS NOTES
SUBJECTIVE:   CC: Estela Ramirez is an 36 year old woman who presents for preventive health visit.       Healthy Habits:     Getting at least 3 servings of Calcium per day:  NO    Bi-annual eye exam:  Yes    Dental care twice a year:  NO    Sleep apnea or symptoms of sleep apnea:  None    Diet:  Regular (no restrictions)    Frequency of exercise:  None    Taking medications regularly:  Yes    Medication side effects:  None    PHQ-2 Total Score: 0    Additional concerns today:  No    She does have a lump on her belly button, does note it. She does not have pain or itching.     She does have ovulation pain as well. She does find that it is uncomfortable and is worse than her period. It's right in the middle, does have a lot of pressure and does have pain with sitting.     Her breathing is doing well. She does use the Wixela daily at night. This is helping with her symptoms. She does have to use her albuterol in the morning but otherwise it's been good.     Over the last few years she has had no desire for intimacy either. She also does not like people touching her. She has been stressed at work as well. Did step down. She does not have anhedonia.     She does not have a great long term memory. She does not remember her oldest child's infancy. She does not remember being younger either.       Today's PHQ-2 Score:   PHQ-2 ( 1999 [employer]) 3/2/2022   Q1: Little interest or pleasure in doing things 0   Q2: Feeling down, depressed or hopeless 0   PHQ-2 Score 0   Q1: Little interest or pleasure in doing things Not at all   Q2: Feeling down, depressed or hopeless Not at all   PHQ-2 Score 0       Abuse: Current or Past (Physical, Sexual or Emotional) - No  Do you feel safe in your environment? Yes    Have you ever done Advance Care Planning? (For example, a Health Directive, POLST, or a discussion with a medical provider or your loved ones about your wishes): No, advance care planning information given to patient to review.   "Patient declined advance care planning discussion at this time.    Social History     Tobacco Use     Smoking status: Not on file     Smokeless tobacco: Not on file   Substance Use Topics     Alcohol use: Not on file       Alcohol Use 3/2/2022   Prescreen: >3 drinks/day or >7 drinks/week? No     Reviewed orders with patient.  Reviewed health maintenance and updated orders accordingly - Yes      Breast Cancer Screening:    Breast CA Risk Assessment (FHS-7) 3/2/2022   Do you have a family history of breast, colon, or ovarian cancer? No / Unknown         Patient under 40 years of age: Routine Mammogram Screening not recommended.   Pertinent mammograms are reviewed under the imaging tab.    History of abnormal Pap smear: NO - age 30-65 PAP every 5 years with negative HPV co-testing recommended  PAP / HPV 10/13/2017 9/15/2015   PAP Negative for squamous intraepithelial lesion or malignancy  Electronically signed by Abby Hernández CT (ASCP) on 10/19/2017 at  1:46 PM   Negative for squamous intraepithelial lesion or malignancy  Electronically signed by Caity Carrillo CT (ASCP) on 9/29/2015 at  7:44 AM       Reviewed and updated as needed this visit by clinical staff    Allergies  Meds  Problems  Med Hx  Surg Hx  Fam Hx  Soc Hx        Reviewed and updated as needed this visit by Provider    Allergies  Meds  Problems  Med Hx  Surg Hx  Fam Hx             Review of Systems  Per above     OBJECTIVE:   /71   Pulse 73   Ht 1.708 m (5' 7.25\")   Wt 73.9 kg (163 lb)   LMP 02/18/2022 (Exact Date)   SpO2 99%   Breastfeeding No   BMI 25.34 kg/m    Physical Exam  GENERAL: healthy, alert and no distress  EYES: Eyes grossly normal to inspection, PERRL and conjunctivae and sclerae normal  HENT: ear canals and TM's normal, nose and mouth without ulcers or lesions  NECK: no adenopathy, no asymmetry, masses, or scars and thyroid normal to palpation  RESP: lungs clear to auscultation - no rales, rhonchi or " wheezes  BREAST: normal without masses, tenderness or nipple discharge and no palpable axillary masses or adenopathy  CV: regular rate and rhythm, normal S1 S2, no S3 or S4, no murmur, click or rub, no peripheral edema and peripheral pulses strong  ABDOMEN: soft, nontender, no hepatosplenomegaly, no masses and bowel sounds normal   (female): normal female external genitalia, normal urethral meatus, vaginal mucosa pink, moist, well rugated, and normal cervix without masses discharge  MS: no gross musculoskeletal defects noted, no edema  SKIN: no suspicious lesions or rashes  NEURO: Normal strength and tone, mentation intact and speech normal  PSYCH: mentation appears normal, affect normal/bright        ASSESSMENT/PLAN:   Estela was seen today for physical.    Diagnoses and all orders for this visit:    Encounter for routine history and physical examination of adult   Routine health maintenance discussion:  No smoking, limited alcohol (7 or less servings per week), 5 fruits/veg servings per day, 200 minutes of exercise per week.  Daily calcium/vitamin D guidelines, bone health, colon cancer screening beginning at age 50.  Accident avoidance, sun screen.     Irritability  -     sertraline (ZOLOFT) 25 MG tablet; Take 1 tablet (25 mg) by mouth daily   Will have her start low dose sertraline for the irritability and if doing well f/u in six weeks virtually. If not starting to improve she will let me know and I can increase dose.     Mittelschmerz  -     norethindrone (MICRONOR) 0.35 MG tablet; Take 1 tablet (0.35 mg) by mouth daily   Would like to limit estrogen due to her MTHFR, will consider minipill to see if this helps, if not she can reach out and we can consider IUD or Nexplanon    Memory problem  -     Vitamin B12; Future  -     TSH with free T4 reflex; Future  -     Comprehensive metabolic panel (BMP + Alb, Alk Phos, ALT, AST, Total. Bili, TP); Future  -     Vitamin B12  -     TSH with free T4 reflex  -      "Comprehensive metabolic panel (BMP + Alb, Alk Phos, ALT, AST, Total. Bili, TP)   Will check basic labs, assuming they are normal weill see how she does with the sertraline.     Cervical cancer screening  -     Pap Screen with HPV - recommended age 30 - 65 years    Need for hepatitis C screening test  -     Hepatitis C Screen Reflex to HCV RNA Quant and Genotype; Future  -     Hepatitis C Screen Reflex to HCV RNA Quant and Genotype    Vitamin D deficiency  -     Vitamin D deficiency screening; Future  -     Vitamin D deficiency screening    Lipid screening  -     Lipid panel reflex to direct LDL Fasting; Future  -     Lipid panel reflex to direct LDL Fasting    Immunization due  -     PPSV23, IM/SUBQ (2+ YRS) - [id number]    Other orders  -     REVIEW OF HEALTH MAINTENANCE PROTOCOL ORDERS  -     COVID-19,PF,PFIZER (12+ YRS)        Patient has been advised of split billing requirements and indicates understanding: Yes    COUNSELING:  Reviewed preventive health counseling, as reflected in patient instructions       Regular exercise       Healthy diet/nutrition       Contraception       One time pneumovax for smokers       Advance Care Planning    Estimated body mass index is 25.34 kg/m  as calculated from the following:    Height as of this encounter: 1.708 m (5' 7.25\").    Weight as of this encounter: 73.9 kg (163 lb).    Weight management plan: Discussed healthy diet and exercise guidelines    She has no history on file for tobacco use.      Counseling Resources:  ATP IV Guidelines  Pooled Cohorts Equation Calculator  Breast Cancer Risk Calculator  BRCA-Related Cancer Risk Assessment: FHS-7 Tool  FRAX Risk Assessment  ICSI Preventive Guidelines  Dietary Guidelines for Americans, 2010  USDA's MyPlate  ASA Prophylaxis  Lung CA Screening    Farheen Robertson MD  [facility]"

## 2022-03-03 LAB
ALBUMIN SERPL-MCNC: 4.3 G/DL (ref 3.5–5)
ALP SERPL-CCNC: 58 U/L (ref 45–120)
ALT SERPL W P-5'-P-CCNC: 12 U/L (ref 0–45)
ANION GAP SERPL CALCULATED.3IONS-SCNC: 13 MMOL/L (ref 5–18)
AST SERPL W P-5'-P-CCNC: 21 U/L (ref 0–40)
BILIRUB SERPL-MCNC: 0.3 MG/DL (ref 0–1)
BUN SERPL-MCNC: 15 MG/DL (ref 8–22)
CALCIUM SERPL-MCNC: 10.1 MG/DL (ref 8.5–10.5)
CHLORIDE BLD-SCNC: 103 MMOL/L (ref 98–107)
CHOLEST SERPL-MCNC: 154 MG/DL
CO2 SERPL-SCNC: 25 MMOL/L (ref 22–31)
CREAT SERPL-MCNC: 0.84 MG/DL (ref 0.6–1.1)
FASTING STATUS PATIENT QL REPORTED: NORMAL
GFR SERPL CREATININE-BSD FRML MDRD: >90 ML/MIN/1.73M2
GLUCOSE BLD-MCNC: 106 MG/DL (ref 70–125)
HDLC SERPL-MCNC: 72 MG/DL
LDLC SERPL CALC-MCNC: 72 MG/DL
POTASSIUM BLD-SCNC: 4.1 MMOL/L (ref 3.5–5)
PROT SERPL-MCNC: 7.3 G/DL (ref 6–8)
SODIUM SERPL-SCNC: 141 MMOL/L (ref 136–145)
TRIGL SERPL-MCNC: 49 MG/DL
TSH SERPL DL<=0.005 MIU/L-ACNC: 0.63 UIU/ML (ref 0.3–5)
VIT B12 SERPL-MCNC: 251 PG/ML (ref 213–816)

## 2022-03-04 LAB
DEPRECATED CALCIDIOL+CALCIFEROL SERPL-MC: 52 UG/L (ref 30–80)
HCV AB SERPL QL IA: NONREACTIVE

## 2022-03-07 LAB
HUMAN PAPILLOMA VIRUS 16 DNA: NEGATIVE
HUMAN PAPILLOMA VIRUS 18 DNA: NEGATIVE
HUMAN PAPILLOMA VIRUS FINAL DIAGNOSIS: NORMAL
HUMAN PAPILLOMA VIRUS OTHER HR: NEGATIVE

## 2022-03-09 LAB
BKR LAB AP GYN ADEQUACY: NORMAL
BKR LAB AP GYN INTERPRETATION: NORMAL
BKR LAB AP HPV REFLEX: NORMAL
BKR LAB AP LMP: NORMAL
BKR LAB AP PREVIOUS ABNORMAL: NORMAL
PATH REPORT.COMMENTS IMP SPEC: NORMAL
PATH REPORT.COMMENTS IMP SPEC: NORMAL
PATH REPORT.RELEVANT HX SPEC: NORMAL

## 2022-04-15 ENCOUNTER — OFFICE VISIT (OUTPATIENT)
Dept: FAMILY MEDICINE | Facility: CLINIC | Age: 37
End: 2022-04-15
Payer: COMMERCIAL

## 2022-04-15 VITALS
BODY MASS INDEX: 25.34 KG/M2 | OXYGEN SATURATION: 99 % | WEIGHT: 163 LBS | DIASTOLIC BLOOD PRESSURE: 68 MMHG | HEART RATE: 73 BPM | SYSTOLIC BLOOD PRESSURE: 110 MMHG

## 2022-04-15 DIAGNOSIS — R45.4 IRRITABILITY: ICD-10-CM

## 2022-04-15 PROCEDURE — 99213 OFFICE O/P EST LOW 20 MIN: CPT | Performed by: FAMILY MEDICINE

## 2022-04-15 RX ORDER — SERTRALINE HYDROCHLORIDE 25 MG/1
25 TABLET, FILM COATED ORAL DAILY
Qty: 90 TABLET | Refills: 3 | Status: SHIPPED | OUTPATIENT
Start: 2022-04-15 | End: 2024-01-17

## 2022-04-15 RX ORDER — FAMOTIDINE 20 MG
TABLET ORAL
COMMUNITY
End: 2024-07-08

## 2022-04-15 NOTE — PROGRESS NOTES
Assessment & Plan     Irritability  -Doing well on current medication, will continue on this and f/u in one year if doing well, will message or call sooner if needed for dose adjustment.   - sertraline (ZOLOFT) 25 MG tablet  Dispense: 90 tablet; Refill: 3                   Return in about 1 year (around 4/15/2023) for Routine preventive.    Farheen Robertson MD  [facility] ESTHER Palmer is a 36 year old who presents for the following health issues     History of Present Illness       Reason for visit:  Follow up    She eats 0-1 servings of fruits and vegetables daily.She consumes 2 sweetened beverage(s) daily.She exercises with enough effort to increase her heart rate 20 to 29 minutes per day.  She exercises with enough effort to increase her heart rate 3 or less days per week.   She is taking medications regularly.       She feels like things are working wellf or her. She has noted no side effects, does not have concerns about going up on the dose. She would like to stay at the same dose for now.     She does have a toe issue as well. She does have a bmp on the left toe medial. It's been tehre for a few weeks. Seems like a callus. Will treat accordingly for now    Review of Systems   Per above      Objective    /68   Pulse 73   Wt 73.9 kg (163 lb)   LMP 03/17/2022 (Exact Date)   SpO2 99%   Breastfeeding No   BMI 25.34 kg/m    Body mass index is 25.34 kg/m .  Physical Exam   GENERAL: healthy, alert and no distress  MS: no gross musculoskeletal defects noted, no edema  PSYCH: mentation appears normal, affect normal/bright

## 2022-04-15 NOTE — LETTER
My Asthma Action Plan    Name: Estela Ramirez   YOB: 1985  Date: 4/18/2022   My doctor: Farheen Robertson MD   My clinic: [facility]        My Control Medicine: Fluticasone propionate + salmeterol (Advair Diskus or Wixela Inhub) -  100/50 mcg 1 puff two times daily  My Rescue Medicine: Albuterol (Proair/Ventolin/Proventil HFA) 2-4 puffs EVERY 4 HOURS as needed. Use a spacer if recommended by your provider.   My Asthma Severity:   Mild Persistent  Know your asthma triggers: upper respiratory infections               GREEN ZONE   Good Control    I feel good    No cough or wheeze    Can work, sleep and play without asthma symptoms       Take your asthma control medicine every day.     1. If exercise triggers your asthma, take your rescue medication    15 minutes before exercise or sports, and    During exercise if you have asthma symptoms  2. Spacer to use with inhaler: If you have a spacer, make sure to use it with your inhaler             YELLOW ZONE Getting Worse  I have ANY of these:    I do not feel good    Cough or wheeze    Chest feels tight    Wake up at night   1. Keep taking your Green Zone medications  2. Start taking your rescue medicine:    every 20 minutes for up to 1 hour. Then every 4 hours for 24-48 hours.  3. If you stay in the Yellow Zone for more than 12-24 hours, contact your doctor.  4. If you do not return to the Green Zone in 12-24 hours or you get worse, start taking your oral steroid medicine if prescribed by your provider.           RED ZONE Medical Alert - Get Help  I have ANY of these:    I feel awful    Medicine is not helping    Breathing getting harder    Trouble walking or talking    Nose opens wide to breathe       1. Take your rescue medicine NOW  2. If your provider has prescribed an oral steroid medicine, start taking it NOW  3. Call your doctor NOW  4. If you are still in the Red Zone after 20 minutes and you have not reached your  doctor:    Take your rescue medicine again and    Call 911 or go to the emergency room right away    See your regular doctor within 2 weeks of an Emergency Room or Urgent Care visit for follow-up treatment.          Annual Reminders:  Meet with Asthma Educator,  Flu Shot in the Fall, consider Pneumonia Vaccination for patients with asthma (aged 19 and older).    Pharmacy:    [id number] - [place], [place] - [address] AT [facility] [address]  [facility] - [place] [id number] [facility], [address]  [facility] - [place] [id number] [facility], [address]  [id number] [place], [place] - [address] AT [facility] [address] [facility]    Electronically signed by Farheen Robertson MD   Date: 04/18/22                      Asthma Triggers  How To Control Things That Make Your Asthma Worse    Triggers are things that make your asthma worse.  Look at the list below to help you find your triggers and what you can do about them.  You can help prevent asthma flare-ups by staying away from your triggers.      Trigger                                                          What you can do   Cigarette Smoke  Tobacco smoke can make asthma worse. Do not allow smoking in your home, car or around you.  Be sure no one smokes at a child s day care or school.  If you smoke, ask your health care provider for ways to help you quit.  Ask family members to quit too.  Ask your health care provider for a referral to Quit Plan to help you quit smoking, or call 8-493-132-PLAN.     Colds, Flu, Bronchitis  These are common triggers of asthma. Wash your hands often.  Don t touch your eyes, nose or mouth.  Get a flu shot every year.     Dust Mites  These are tiny bugs that live in cloth or carpet. They are too small to see. Wash sheets and blankets in hot water every week.   Encase pillows and mattress in dust mite proof covers.  Avoid having carpet if you can. If you  have carpet, vacuum weekly.   Use a dust mask and HEPA vacuum.   Pollen and Outdoor Mold  Some people are allergic to trees, grass, or weed pollen, or molds. Try to keep your windows closed.  Limit time out doors when pollen count is high.   Ask you health care provider about taking medicine during allergy season.     Animal Dander  Some people are allergic to skin flakes, urine or saliva from pets with fur or feathers. Keep pets with fur or feathers out of your home.    If you can t keep the pet outdoors, then keep the pet out of your bedroom.  Keep the bedroom door closed.  Keep pets off cloth furniture and away from stuffed toys.     Mice, Rats, and Cockroaches   Some people are allergic to the waste from these pests.   Cover food and garbage.  Clean up spills and food crumbs.  Store grease in the refrigerator.   Keep food out of the bedroom.   Indoor Mold  This can be a trigger if your home has high moisture. Fix leaking faucets, pipes, or other sources of water.   Clean moldy surfaces.  Dehumidify basement if it is damp and smelly.   Smoke, Strong Odors, and Sprays  These can reduce air quality. Stay away from strong odors and sprays, such as perfume, powder, hair spray, paints, smoke incense, paint, cleaning products, candles and new carpet.   Exercise or Sports  Some people with asthma have this trigger. Be active!  Ask your doctor about taking medicine before sports or exercise to prevent symptoms.    Warm up for 5-10 minutes before and after sports or exercise.     Other Triggers of Asthma  Cold air:  Cover your nose and mouth with a scarf.  Sometimes laughing or crying can be a trigger.  Some medicines and food can trigger asthma.

## 2022-07-07 ENCOUNTER — HOSPITAL ENCOUNTER (OUTPATIENT)
Dept: ULTRASOUND IMAGING | Facility: CLINIC | Age: 37
Discharge: HOME | End: 2022-07-07
Attending: FAMILY MEDICINE | Admitting: FAMILY MEDICINE
Payer: COMMERCIAL

## 2022-07-07 DIAGNOSIS — N83.201 CYST OF RIGHT OVARY: ICD-10-CM

## 2022-07-07 PROCEDURE — 76856 US EXAM PELVIC COMPLETE: CPT

## 2022-09-25 ENCOUNTER — HEALTH MAINTENANCE LETTER (OUTPATIENT)
Age: 37
End: 2022-09-25

## 2022-09-26 DIAGNOSIS — J45.909 ASTHMA: ICD-10-CM

## 2022-09-26 NOTE — TELEPHONE ENCOUNTER
"Routing refill request to provider for review/approval because:  act    Last Written Prescription Date:  1/19/22  Last Fill Quantity: 180,  # refills: 1   Last office visit provider:  4/1522     Requested Prescriptions   Pending Prescriptions Disp Refills     WIXELA INHUB 100-50 MCG/ACT inhaler [Pharmacy Med Name: Wixela Inhub 100-50 MCG/DOSE Inhalation Aerosol Powder Breath Activated]  3     Sig: USE 1 INHALATION BY MOUTH  TWICE DAILY       Inhaled Steroids Protocol Failed - 9/26/2022  8:26 AM        Failed - Asthma control assessment score within normal limits in last 6 months     Please review ACT score.           Passed - Patient is age 12 or older        Passed - Medication is active on med list        Passed - Recent (6 mo) or future (30 days) visit within the authorizing provider's specialty     Patient had office visit in the last 6 months or has a visit in the next 30 days with authorizing provider or within the authorizing provider's specialty.  See \"Patient Info\" tab in inbasket, or \"Choose Columns\" in Meds & Orders section of the refill encounter.           Long-Acting Beta Agonist Inhalers Protocol  Failed - 9/26/2022  8:26 AM        Failed - Asthma control assessment score within normal limits in last 6 months     Please review ACT score.           Failed - Order for Serevent, Striverdi, or Foradil and pt has steroid inhaler        Passed - Patient is age 12 or older        Passed - Medication is active on med list        Passed - Recent (6 mo) or future (30 days) visit within the authorizing provider's specialty     Patient had office visit in the last 6 months or has a visit in the next 30 days with authorizing provider or within the authorizing provider's specialty.  See \"Patient Info\" tab in inbasket, or \"Choose Columns\" in Meds & Orders section of the refill encounter.                 Noemi Mondragon RN 09/26/22 4:51 PM  "

## 2022-09-27 RX ORDER — FLUTICASONE PROPIONATE AND SALMETEROL 100; 50 UG/1; UG/1
POWDER RESPIRATORY (INHALATION)
Qty: 180 EACH | Refills: 1 | Status: SHIPPED | OUTPATIENT
Start: 2022-09-27 | End: 2023-07-19

## 2022-10-12 ENCOUNTER — OFFICE VISIT (OUTPATIENT)
Dept: FAMILY MEDICINE | Facility: CLINIC | Age: 37
End: 2022-10-12
Payer: COMMERCIAL

## 2022-10-12 ENCOUNTER — ANCILLARY PROCEDURE (OUTPATIENT)
Dept: GENERAL RADIOLOGY | Facility: CLINIC | Age: 37
End: 2022-10-12
Attending: PHYSICIAN ASSISTANT
Payer: COMMERCIAL

## 2022-10-12 VITALS
OXYGEN SATURATION: 100 % | BODY MASS INDEX: 25.65 KG/M2 | TEMPERATURE: 98.4 F | WEIGHT: 165 LBS | RESPIRATION RATE: 16 BRPM | SYSTOLIC BLOOD PRESSURE: 118 MMHG | DIASTOLIC BLOOD PRESSURE: 82 MMHG | HEART RATE: 66 BPM

## 2022-10-12 DIAGNOSIS — J30.2 SEASONAL ALLERGIC RHINITIS, UNSPECIFIED TRIGGER: ICD-10-CM

## 2022-10-12 DIAGNOSIS — R09.81 NASAL CONGESTION: ICD-10-CM

## 2022-10-12 DIAGNOSIS — R06.02 SHORTNESS OF BREATH: Primary | ICD-10-CM

## 2022-10-12 PROCEDURE — 93010 ELECTROCARDIOGRAM REPORT: CPT | Performed by: INTERNAL MEDICINE

## 2022-10-12 PROCEDURE — 71046 X-RAY EXAM CHEST 2 VIEWS: CPT | Mod: TC | Performed by: RADIOLOGY

## 2022-10-12 PROCEDURE — 99213 OFFICE O/P EST LOW 20 MIN: CPT | Performed by: PHYSICIAN ASSISTANT

## 2022-10-12 PROCEDURE — 93005 ELECTROCARDIOGRAM TRACING: CPT | Performed by: PHYSICIAN ASSISTANT

## 2022-10-12 RX ORDER — MONTELUKAST SODIUM 10 MG/1
10 TABLET ORAL AT BEDTIME
Qty: 30 TABLET | Refills: 0 | Status: SHIPPED | OUTPATIENT
Start: 2022-10-12 | End: 2024-03-20

## 2022-10-12 NOTE — PROGRESS NOTES
Assessment & Plan:      Problem List Items Addressed This Visit    None  Visit Diagnoses     Shortness of breath    -  Primary    Relevant Medications    montelukast (SINGULAIR) 10 MG tablet    Other Relevant Orders    EKG 12-lead, tracing only (Completed)    XR Chest 2 Views (Completed)    Nasal congestion        Relevant Medications    montelukast (SINGULAIR) 10 MG tablet    Seasonal allergic rhinitis, unspecified trigger        Relevant Medications    montelukast (SINGULAIR) 10 MG tablet        Medical Decision Making  Patient presents with on and off sensation of palpations and shortness of breath.  EKG shows normal sinus rhythm with no signs of arrhythmia or ST elevations.  Chest x-ray is negative for focal pneumonia.  Lung auscultation is clear and there are no signs of respiratory distress.  Recommend patient decrease use of albuterol inhaler as this does not seem to be helping her symptoms and could be causing increase in heart rate and palpitations.  The patient most likely has ongoing nasal congestion secondary to seasonal allergies.  Recommend trial of Singulair and nasal steroids.  Allergies and medication interactions reviewed.  Discussed signs of worsening symptoms and when to follow-up with PCP if no symptom improvement.     Subjective:      Estela Ramirez is a 36 year old female here for evaluation of palpitations and shortness of breath.  Onset of symptoms was 5 to 6 weeks ago.  Patient was initially diagnosed with COVID-19.  Ever since that time she feels like she has difficulty taking a full deep breath.  She has been using albuterol rescue inhaler several times a day with mild relief of symptoms.  Patient also takes inhaled steroid at baseline.  She also has history of seasonal allergies and takes daily Zyrtec and Flonase nasal spray.  Patient also notes a plugged right nostril.  She does have a right deviated septum.  She did use single dose of Afrin nasal spray with mild relief of symptoms.   Patient does not feel stressed or anxious.  Palpitations usually occur at rest or at nighttime.  Patient does not note the palpitations while she is being active.  No chest pains.     The following portions of the patient's history were reviewed and updated as appropriate: allergies, current medications, and problem list.     Review of Systems  Pertinent items are noted in HPI.    Allergies  Allergies   Allergen Reactions     Cephalosporins Hives     Other Allergy (See Comments) [External Allergen Needs Reconciliation - See Comment] Itching and Swelling     Other, 09/15/2014.  Reaction: Other  Ham        Family History   Problem Relation Age of Onset     Varicose Veins Mother      Pancreatic Cancer Brother      Pancreatic Cancer Maternal Grandmother      Throat cancer Maternal Grandfather      Diabetes Father      Asthma Father      Depression Father        Social History     Tobacco Use     Smoking status: Not on file     Smokeless tobacco: Not on file   Substance Use Topics     Alcohol use: Not on file        Objective:      /82   Pulse 66   Temp 98.4  F (36.9  C) (Oral)   Resp 16   Wt 74.8 kg (165 lb)   LMP 10/06/2022   SpO2 100%   BMI 25.65 kg/m    General appearance - alert, well appearing, and in no distress and non-toxic  Ears - bilateral TM's and external ear canals normal  Nose - normal and patent, no erythema, discharge or polyps  Mouth - mucous membranes moist, pharynx normal without lesions  Neck - supple, no significant adenopathy  Chest - clear to auscultation, no wheezes, rales or rhonchi, symmetric air entry  Heart - normal rate, regular rhythm, normal S1, S2, no murmurs, rubs, clicks or gallops     Lab & Imaging Results    Results for orders placed or performed in visit on 10/12/22   XR Chest 2 Views     Status: None    Narrative    EXAM: XR CHEST 2 VIEWS  LOCATION: [facility]  DATE/TIME: 10/12/2022 6:27 PM    INDICATION: ongoing cough and chest tightness 1 month  following covid 19; rule out viral versus bacterial pneumonia  COMPARISON: 10/30/2017      Impression    IMPRESSION: Negative chest. No change.   EKG 12-lead, tracing only     Status: None (Preliminary result)   Result Value Ref Range    Systolic Blood Pressure  mmHg    Diastolic Blood Pressure  mmHg    Ventricular Rate 58 BPM    Atrial Rate 58 BPM    WA Interval 148 ms    QRS Duration 80 ms     ms    QTc 418 ms    P Axis 66 degrees    R AXIS 75 degrees    T Axis 58 degrees    Interpretation ECG       Sinus bradycardia  Otherwise normal ECG  No previous ECGs available         I personally reviewed these results and discussed findings with the patient.    The use of Dragon/PowerMic dictation services was used to construct the content of this note; any grammatical errors are non-intentional. Please contact the author directly if you are in need of any clarification.

## 2022-10-13 LAB
ATRIAL RATE - MUSE: 58 BPM
DIASTOLIC BLOOD PRESSURE - MUSE: NORMAL MMHG
INTERPRETATION ECG - MUSE: NORMAL
P AXIS - MUSE: 66 DEGREES
PR INTERVAL - MUSE: 148 MS
QRS DURATION - MUSE: 80 MS
QT - MUSE: 426 MS
QTC - MUSE: 418 MS
R AXIS - MUSE: 75 DEGREES
SYSTOLIC BLOOD PRESSURE - MUSE: NORMAL MMHG
T AXIS - MUSE: 58 DEGREES
VENTRICULAR RATE- MUSE: 58 BPM

## 2022-11-18 ENCOUNTER — ANCILLARY PROCEDURE (OUTPATIENT)
Dept: GENERAL RADIOLOGY | Facility: CLINIC | Age: 37
End: 2022-11-18
Attending: EMERGENCY MEDICINE
Payer: COMMERCIAL

## 2022-11-18 ENCOUNTER — OFFICE VISIT (OUTPATIENT)
Dept: FAMILY MEDICINE | Facility: CLINIC | Age: 37
End: 2022-11-18
Payer: COMMERCIAL

## 2022-11-18 VITALS
WEIGHT: 164.2 LBS | TEMPERATURE: 97.8 F | OXYGEN SATURATION: 100 % | BODY MASS INDEX: 25.53 KG/M2 | DIASTOLIC BLOOD PRESSURE: 82 MMHG | RESPIRATION RATE: 16 BRPM | HEART RATE: 79 BPM | SYSTOLIC BLOOD PRESSURE: 130 MMHG

## 2022-11-18 DIAGNOSIS — R05.1 ACUTE COUGH: ICD-10-CM

## 2022-11-18 DIAGNOSIS — R05.1 ACUTE COUGH: Primary | ICD-10-CM

## 2022-11-18 DIAGNOSIS — J45.21 MILD INTERMITTENT ASTHMA WITH EXACERBATION: ICD-10-CM

## 2022-11-18 LAB
FLUAV AG SPEC QL IA: NEGATIVE
FLUBV AG SPEC QL IA: NEGATIVE

## 2022-11-18 PROCEDURE — U0003 INFECTIOUS AGENT DETECTION BY NUCLEIC ACID (DNA OR RNA); SEVERE ACUTE RESPIRATORY SYNDROME CORONAVIRUS 2 (SARS-COV-2) (CORONAVIRUS DISEASE [COVID-19]), AMPLIFIED PROBE TECHNIQUE, MAKING USE OF HIGH THROUGHPUT TECHNOLOGIES AS DESCRIBED BY CMS-2020-01-R: HCPCS | Performed by: EMERGENCY MEDICINE

## 2022-11-18 PROCEDURE — U0005 INFEC AGEN DETEC AMPLI PROBE: HCPCS | Performed by: EMERGENCY MEDICINE

## 2022-11-18 PROCEDURE — 99214 OFFICE O/P EST MOD 30 MIN: CPT | Mod: CS | Performed by: EMERGENCY MEDICINE

## 2022-11-18 PROCEDURE — 87804 INFLUENZA ASSAY W/OPTIC: CPT | Performed by: EMERGENCY MEDICINE

## 2022-11-18 PROCEDURE — 71046 X-RAY EXAM CHEST 2 VIEWS: CPT | Mod: TC | Performed by: RADIOLOGY

## 2022-11-18 RX ORDER — BENZONATATE 100 MG/1
100 CAPSULE ORAL 3 TIMES DAILY PRN
Qty: 15 CAPSULE | Refills: 0 | Status: SHIPPED | OUTPATIENT
Start: 2022-11-18 | End: 2024-01-17

## 2022-11-18 RX ORDER — PREDNISONE 20 MG/1
20 TABLET ORAL DAILY
Qty: 5 TABLET | Refills: 0 | Status: SHIPPED | OUTPATIENT
Start: 2022-11-18 | End: 2022-11-23

## 2022-11-18 NOTE — PROGRESS NOTES
Impression:  Viral URI with probably mild asthma flare, no pneumonia or influenza, COVID is pending    Plan:  Prednisone for 5 days, Tessalon for cough, fluids, Tylenol, rest, return or seek care if new or worsening symptoms      Chief Complaint:  Patient presents with:  URI: Cough, fever, chest pan for 5 days           HPI:   Estela Ramirez is a 36 year old female who presents to this clinic for the evaluation of cough.  Patient complains of 5-day history of a cough.  It started out as a dry cough and not become productive of clear sputum.  No hemoptysis.  She does have shortness of breath with exertion but none at rest.  No wheezing but she has a history of asthma.  Has had fevers.  Complains of pleuritic chest pain when she coughs.  No nausea or vomiting.  No other complaints      PMH:   Past Medical History:   Diagnosis Date     Asthma      MTHFR mutation      Past Surgical History:   Procedure Laterality Date     HEMILAMINOTOMY LUMBAR SPINE Left 10/14/2003    L4-5     IR LUMBAR EPIDURAL STEROID INJECTION  9/22/2006         ROS:  All other systems negative    Meds:    Current Outpatient Medications:      albuterol (PROAIR HFA/PROVENTIL HFA/VENTOLIN HFA) 108 (90 Base) MCG/ACT inhaler, USE 2 INHALATIONS BY MOUTH  EVERY 6 HOURS AS NEEDED FOR WHEEZING, Disp: 34 g, Rfl: 3     cetirizine (ZYRTEC) 10 MG tablet, [CETIRIZINE (ZYRTEC) 10 MG TABLET] Take 10 mg by mouth daily., Disp: , Rfl:      Vitamin D (Cholecalciferol) 25 MCG (1000 UT) CAPS, , Disp: , Rfl:      WIXELA INHUB 100-50 MCG/ACT inhaler, USE 1 INHALATION BY MOUTH  TWICE DAILY, Disp: 180 each, Rfl: 1     montelukast (SINGULAIR) 10 MG tablet, Take 1 tablet (10 mg) by mouth At Bedtime for 30 days, Disp: 30 tablet, Rfl: 0     norethindrone (MICRONOR) 0.35 MG tablet, Take 1 tablet (0.35 mg) by mouth daily (Patient not taking: Reported on 10/12/2022), Disp: 84 tablet, Rfl: 3     sertraline (ZOLOFT) 25 MG tablet, Take 1 tablet (25 mg) by mouth daily (Patient not  taking: Reported on 10/12/2022), Disp: 90 tablet, Rfl: 3        Social:  Social History     Socioeconomic History     Marital status:      Spouse name: Not on file     Number of children: Not on file     Years of education: Not on file     Highest education level: Not on file   Occupational History     Not on file   Tobacco Use     Smoking status: Not on file     Smokeless tobacco: Not on file   Substance and Sexual Activity     Alcohol use: Not on file     Drug use: Not on file     Sexual activity: Yes     Partners: Male   Other Topics Concern     Not on file   Social History Narrative     Not on file     Social Determinants of Health     Financial Resource Strain: Not on file   Food Insecurity: Not on file   Transportation Needs: Not on file   Physical Activity: Not on file   Stress: Not on file   Social Connections: Not on file   Intimate Partner Violence: Not on file   Housing Stability: Not on file         Physical Exam:  Vitals:    11/18/22 1622   BP: 130/82   Pulse: 79   Resp: 16   Temp: 97.8  F (36.6  C)   TempSrc: Oral   SpO2: 100%   Weight: 74.5 kg (164 lb 3.2 oz)      Patient is awake, alert, no distress, coarse cough but no respiratory distress  Pharynx: Erythema, no exudate, airway patent  Lungs: Clear without distress  Skin: No lesions or rash  Neuro: Normal motor and sensory function in all extremities  Psych: Awake, alert, normally responsive      Results:    Results for orders placed or performed in visit on 11/18/22 (from the past 24 hour(s))   Influenza A & B Antigen    Specimen: Nose; Swab   Result Value Ref Range    Influenza A antigen Negative Negative    Influenza B antigen Negative Negative    Narrative    Test results must be correlated with clinical data. If necessary, results should be confirmed by a molecular assay or viral culture.              Mohsen Krishnan MD

## 2022-11-19 LAB — SARS-COV-2 RNA RESP QL NAA+PROBE: NEGATIVE

## 2023-04-17 ENCOUNTER — OFFICE VISIT (OUTPATIENT)
Dept: FAMILY MEDICINE | Facility: CLINIC | Age: 38
End: 2023-04-17
Payer: COMMERCIAL

## 2023-04-17 VITALS
TEMPERATURE: 98.1 F | HEART RATE: 69 BPM | WEIGHT: 164 LBS | RESPIRATION RATE: 14 BRPM | SYSTOLIC BLOOD PRESSURE: 118 MMHG | BODY MASS INDEX: 25.5 KG/M2 | DIASTOLIC BLOOD PRESSURE: 79 MMHG | OXYGEN SATURATION: 99 %

## 2023-04-17 DIAGNOSIS — J02.9 SORE THROAT: Primary | ICD-10-CM

## 2023-04-17 LAB
DEPRECATED S PYO AG THROAT QL EIA: NEGATIVE
GROUP A STREP BY PCR: NOT DETECTED

## 2023-04-17 PROCEDURE — 87651 STREP A DNA AMP PROBE: CPT | Performed by: PHYSICIAN ASSISTANT

## 2023-04-17 PROCEDURE — 99213 OFFICE O/P EST LOW 20 MIN: CPT | Performed by: PHYSICIAN ASSISTANT

## 2023-04-17 NOTE — PROGRESS NOTES
Patient presents with:  Throat Problem: Has sore throat was exposed to strep sx's started yesterday       Clinical Decision Making:  Sore throat symptoms that started yesterday.  Rapid strep test negative.  Ongoing cold symptoms, but lungs are CTA and patient is vitally stable.  Low suspicion for pneumonia.  Recommend continued supportive cares.  Patient is agreeable to this plan.      ICD-10-CM    1. Sore throat  J02.9 Streptococcus A Rapid Screen w/Reflex to PCR - Clinic Collect     Group A Streptococcus PCR Throat Swab          Patient Instructions   1) Increase fluids and rest  2) Try Mucinex and Neti pot over the counter for congestion  3) Continue taking Tylenol/Ibuprofen for fever/pain relief as needed.  4) Salt water gargles and lozenges can be helpful for throat relief  5) You will only be notified of the confirmatory strep results if they are positive.         HPI:  Estela Ramirez is a 37 year old female who presents today complaining of sore throat that started yesterday.  Patient was exposed to strep from her daughter.  Patient also had a cold about 2 months back and has continued to have cough.  No known fevers.    History obtained from the patient.    Problem List:  2018: Lumbar radiculopathy  2017: Pregnant  2017: Encounter for supervision of other normal pregnancy in   second trimester  2017: Varicose vein of leg  2015: MTHFR mutation  2015: History of superficial phlebitis  Asthma  Lower back pain  Nicotine Dependence  Vitamin D deficiency  Superficial thrombophlebitis, antepartum   (spontaneous vaginal delivery)      Past Medical History:   Diagnosis Date     Asthma      MTHFR mutation        Social History     Tobacco Use     Smoking status: Every Day     Types: Cigarettes     Smokeless tobacco: Not on file   Vaping Use     Vaping status: Not on file   Substance Use Topics     Alcohol use: Not on file       Review of Systems    Vitals:    23 1318   BP: 118/79   Pulse: 69    Resp: 14   Temp: 98.1  F (36.7  C)   TempSrc: Oral   SpO2: 99%   Weight: 74.4 kg (164 lb)       Physical Exam  Vitals and nursing note reviewed.   Constitutional:       General: She is not in acute distress.     Appearance: She is not toxic-appearing or diaphoretic.   HENT:      Head: Normocephalic and atraumatic.      Right Ear: External ear normal.      Left Ear: External ear normal.      Mouth/Throat:      Mouth: Mucous membranes are moist.      Pharynx: Posterior oropharyngeal erythema (Mild) present. No oropharyngeal exudate.   Eyes:      Conjunctiva/sclera: Conjunctivae normal.   Cardiovascular:      Rate and Rhythm: Normal rate and regular rhythm.      Heart sounds: No murmur heard.  Pulmonary:      Effort: Pulmonary effort is normal. No respiratory distress.      Breath sounds: No stridor. No wheezing, rhonchi or rales.   Lymphadenopathy:      Cervical: No cervical adenopathy.   Neurological:      Mental Status: She is alert.   Psychiatric:         Mood and Affect: Mood normal.         Behavior: Behavior normal.         Thought Content: Thought content normal.         Judgment: Judgment normal.         Results:  Results for orders placed or performed in visit on 04/17/23   Streptococcus A Rapid Screen w/Reflex to PCR - Clinic Collect     Status: Normal    Specimen: Throat; Swab   Result Value Ref Range    Group A Strep antigen Negative Negative         At the end of the encounter, I discussed results, diagnosis, medications. Discussed red flags for immediate return to clinic/ER, as well as indications for follow up if no improvement. Patient understood and agreed to plan. Patient was stable for discharge.

## 2023-05-13 ENCOUNTER — HEALTH MAINTENANCE LETTER (OUTPATIENT)
Age: 38
End: 2023-05-13

## 2023-05-16 ENCOUNTER — E-VISIT (OUTPATIENT)
Dept: FAMILY MEDICINE | Facility: CLINIC | Age: 38
End: 2023-05-16
Payer: COMMERCIAL

## 2023-05-16 DIAGNOSIS — H10.33 ACUTE CONJUNCTIVITIS OF BOTH EYES, UNSPECIFIED ACUTE CONJUNCTIVITIS TYPE: Primary | ICD-10-CM

## 2023-05-16 PROCEDURE — 99421 OL DIG E/M SVC 5-10 MIN: CPT | Performed by: FAMILY MEDICINE

## 2023-05-16 RX ORDER — POLYMYXIN B SULFATE AND TRIMETHOPRIM 1; 10000 MG/ML; [USP'U]/ML
SOLUTION OPHTHALMIC
Qty: 10 ML | Refills: 0 | Status: SHIPPED | OUTPATIENT
Start: 2023-05-16 | End: 2023-05-23

## 2023-05-16 NOTE — PATIENT INSTRUCTIONS
Thank you for choosing us for your care. I have placed an order for a prescription so that you can start treatment. View your full visit summary for details by clicking on the link below. Your pharmacist will able to address any questions you may have about the medication.     If you re not feeling better within 2-3 days, please schedule an appointment.  You can schedule an appointment right here in [facility], or call [phone]  If the visit is for the same symptoms as your eVisit, we ll refund the cost of your eVisit if seen within seven days.      Conjunctivitis, Nonspecific    The membrane that covers the white part of your eye (the conjunctiva) is inflamed. Inflammation happens when your body responds to an injury, allergic reaction, infection, or illness. Symptoms of inflammation in the eye may include redness, irritation, itching, swelling, or burning. These symptoms should go away within the next 24 hours. Conjunctivitis may be related to a particle that was in your eye. If so, it may wash out with your tears or irrigation treatment. Being exposed to liquid chemicals or fumes may also cause this reaction.    Home care    Put a cold pack on the eye for 20 minutes at a time. This will reduce pain. To make a cold pack, put ice cubes in a plastic bag that seals at the top. Wrap the bag in a clean, thin towel or cloth.    Artificial tears may be prescribed to reduce irritation or redness. These should be used 3 to 4 times a day.    You may use acetaminophen or ibuprofen to control pain, unless another medicine was prescribed. If you have chronic liver or kidney disease, talk with your healthcare provider before using these medicines. Also talk with your provider if you have ever had a stomach ulcer or gastrointestinal bleeding.    If you wear contact lenses, don't use them until your healthcare provider says it's OK.    Follow-up care  Follow up with your healthcare provider, or as advised.   When to seek  medical advice  Call your healthcare provider right away if any of the following occur:     Eyelid swells more    Eye pain gets worse    Redness or drainage from the eye gets worse    Blurry vision gets worse, or you have increased sensitivity to light    Normal vision does not return within 24 to 48 hours  Michelle last reviewed this educational content on 6/1/2022 2000-2022 [employer]. All rights reserved. This information is not intended as a substitute for professional medical care. Always follow your healthcare professional's instructions.

## 2023-05-16 NOTE — TELEPHONE ENCOUNTER
Provider E-Visit time total (minutes): 2      Assessment:   The primary encounter diagnosis was (H10.33) Acute conjunctivitis of both eyes, unspecified acute conjunctivitis type  (primary encounter diagnosis)  Comment:   Plan: trimethoprim-polymyxin b (POLYTRIM) 69929-7.1         UNIT/ML-% ophthalmic solution              Plan:   No orders of the defined types were placed in this encounter.      Patient Instructions     Thank you for choosing us for your care. I have placed an order for a prescription so that you can start treatment. View your full visit summary for details by clicking on the link below. Your pharmacist will able to address any questions you may have about the medication.     If you re not feeling better within 2-3 days, please schedule an appointment.  You can schedule an appointment right here in [facility], or call [phone]  If the visit is for the same symptoms as your eVisit, we ll refund the cost of your eVisit if seen within seven days.      Conjunctivitis, Nonspecific    The membrane that covers the white part of your eye (the conjunctiva) is inflamed. Inflammation happens when your body responds to an injury, allergic reaction, infection, or illness. Symptoms of inflammation in the eye may include redness, irritation, itching, swelling, or burning. These symptoms should go away within the next 24 hours. Conjunctivitis may be related to a particle that was in your eye. If so, it may wash out with your tears or irrigation treatment. Being exposed to liquid chemicals or fumes may also cause this reaction.    Home care    Put a cold pack on the eye for 20 minutes at a time. This will reduce pain. To make a cold pack, put ice cubes in a plastic bag that seals at the top. Wrap the bag in a clean, thin towel or cloth.    Artificial tears may be prescribed to reduce irritation or redness. These should be used 3 to 4 times a day.    You may use acetaminophen or ibuprofen to control pain, unless  another medicine was prescribed. If you have chronic liver or kidney disease, talk with your healthcare provider before using these medicines. Also talk with your provider if you have ever had a stomach ulcer or gastrointestinal bleeding.    If you wear contact lenses, don't use them until your healthcare provider says it's OK.    Follow-up care  Follow up with your healthcare provider, or as advised.   When to seek medical advice  Call your healthcare provider right away if any of the following occur:     Eyelid swells more    Eye pain gets worse    Redness or drainage from the eye gets worse    Blurry vision gets worse, or you have increased sensitivity to light    Normal vision does not return within 24 to 48 hours  [employer] last reviewed this educational content on 6/1/2022 2000-2022 [employer]. All rights reserved. This information is not intended as a substitute for professional medical care. Always follow your healthcare professional's instructions.            Subjective:   Estela Ramirez is a 37 year old female who submitted an eVisit request for evaluation of   Chief Complaint   Patient presents with     Conjunctivitis     [facility]     Conjunctivitis       See the questionnaire and message section of encounter report for information related to history of present illness and review of systems.    Portions of the patient's history were reviewed and updated as appropriate.     Objective:   No exam performed today, patient submitted as eVisit.

## 2023-05-31 DIAGNOSIS — J45.909 ASTHMA: ICD-10-CM

## 2023-05-31 NOTE — TELEPHONE ENCOUNTER
Refill Request  Medication name: Pending Prescriptions:                       Disp   Refills    albuterol (PROAIR HFA/PROVENTIL HFA/RYAN*34 g   3          Requested Pharmacy:     [facility] - [address]  [facility] - [place], [place] - [address]

## 2023-06-01 RX ORDER — ALBUTEROL SULFATE 90 UG/1
AEROSOL, METERED RESPIRATORY (INHALATION)
Qty: 34 G | Refills: 3 | OUTPATIENT
Start: 2023-06-01

## 2023-06-01 NOTE — TELEPHONE ENCOUNTER
"Refused because:  Was last ordered today, another request send to provider to send to [facility] in a different encounter        Last Written Prescription Date:  6/1/23  Last Fill Quantity: 18g,  # refills: 11   Last office visit provider:  4/15/22    Requested Prescriptions   Pending Prescriptions Disp Refills     albuterol (PROAIR HFA/PROVENTIL HFA/VENTOLIN HFA) 108 (90 Base) MCG/ACT inhaler 34 g 3       Asthma Maintenance Inhalers - Anticholinergics Failed - 6/1/2023  2:32 PM        Failed - Asthma control assessment score within normal limits in last 6 months     Please review ACT score.           Passed - Patient is age 12 years or older        Passed - Medication is active on med list        Passed - Recent (6 mo) or future (30 days) visit within the authorizing provider's specialty     Patient had office visit in the last 6 months or has a visit in the next 30 days with authorizing provider or within the authorizing provider's specialty.  See \"Patient Info\" tab in inbasket, or \"Choose Columns\" in Meds & Orders section of the refill encounter.           Short-Acting Beta Agonist Inhalers Protocol  Failed - 6/1/2023  2:32 PM        Failed - Asthma control assessment score within normal limits in last 6 months     Please review ACT score.           Passed - Patient is age 12 or older        Passed - Medication is active on med list        Passed - Recent (6 mo) or future (30 days) visit within the authorizing provider's specialty     Patient had office visit in the last 6 months or has a visit in the next 30 days with authorizing provider or within the authorizing provider's specialty.  See \"Patient Info\" tab in inbasket, or \"Choose Columns\" in Meds & Orders section of the refill encounter.                 SONYA MARTINES RN 06/01/23 2:33 PM  "

## 2024-01-17 ENCOUNTER — OFFICE VISIT (OUTPATIENT)
Dept: FAMILY MEDICINE | Facility: CLINIC | Age: 39
End: 2024-01-17
Payer: COMMERCIAL

## 2024-01-17 VITALS
HEIGHT: 67 IN | TEMPERATURE: 97.8 F | DIASTOLIC BLOOD PRESSURE: 64 MMHG | SYSTOLIC BLOOD PRESSURE: 110 MMHG | WEIGHT: 165 LBS | OXYGEN SATURATION: 99 % | HEART RATE: 87 BPM | RESPIRATION RATE: 16 BRPM | BODY MASS INDEX: 25.9 KG/M2

## 2024-01-17 DIAGNOSIS — R76.9 POSITIVE ALLERGY TEST: Primary | ICD-10-CM

## 2024-01-17 DIAGNOSIS — J45.909 UNCOMPLICATED ASTHMA, UNSPECIFIED ASTHMA SEVERITY, UNSPECIFIED WHETHER PERSISTENT: ICD-10-CM

## 2024-01-17 PROCEDURE — 99213 OFFICE O/P EST LOW 20 MIN: CPT | Performed by: FAMILY MEDICINE

## 2024-01-17 PROCEDURE — 36415 COLL VENOUS BLD VENIPUNCTURE: CPT | Performed by: FAMILY MEDICINE

## 2024-01-17 PROCEDURE — 86003 ALLG SPEC IGE CRUDE XTRC EA: CPT | Performed by: FAMILY MEDICINE

## 2024-01-17 RX ORDER — FLUTICASONE PROPIONATE AND SALMETEROL 100; 50 UG/1; UG/1
POWDER RESPIRATORY (INHALATION)
Qty: 180 EACH | Refills: 1 | Status: SHIPPED | OUTPATIENT
Start: 2024-01-17 | End: 2024-03-20

## 2024-01-17 ASSESSMENT — ENCOUNTER SYMPTOMS: WHEEZING: 1

## 2024-01-17 NOTE — PROGRESS NOTES
"  Assessment & Plan     Positive allergy test  -Patient with positive allergy testing when she was a child to wheat and peanuts as well as borderline positive to carrot, egg whites, rice, soy beans, tomatoes and corn.  Given this and her worsening eczematous symptoms and asthma symptoms when she eats foods containing some of these will obtain labs as listed and can consider referral to allergy for further testing.   - Allergen, [place] Large IgE Panel  - Allergen oat IgE  - Allergen tomato IgE  - Allergen Carrot IgE  - Allergen rice IgE  - Allergy adult food panel  - Allergen corn IgE  - Allergen, [place] Large IgE Panel  - Allergen oat IgE  - Allergen tomato IgE  - Allergen Carrot IgE  - Allergen rice IgE  - Allergy adult food panel  - Allergen corn IgE    Uncomplicated asthma, unspecified asthma severity, unspecified whether persistent  -Doing well, ok to refill inhaler as needed.   - fluticasone-salmeterol (WIXELA INHUB) 100-50 MCG/ACT inhaler  Dispense: 180 each; Refill: 1      Nicotine/Tobacco Cessation  She reports that she has been smoking cigarettes. She does not have any smokeless tobacco history on file.  Nicotine/Tobacco Cessation Plan  Not discussed today      BMI  Estimated body mass index is 25.65 kg/m  as calculated from the following:    Height as of this encounter: 1.708 m (5' 7.25\").    Weight as of this encounter: 74.8 kg (165 lb).   Weight management plan: not discussed today        Nuha Palmer is a 38 year old, presenting for the following health issues:  Allergies      1/17/2024    10:28 AM   Additional Questions   Roomed by Amrita Rosen    History of Present Illness       Reason for visit:  Food allergies    She eats 0-1 servings of fruits and vegetables daily.She consumes 2 sweetened beverage(s) daily.She exercises with enough effort to increase her heart rate 30 to 60 minutes per day.  She exercises with enough effort to increase her heart rate 3 or less days per week.   She " "is taking medications regularly.       She comes in today to discuss some food allergies. She found some allergy testing that she had when she was a kid and was found to be borderline allergic to carrot, egg whie, rice, soybean, tomato and corn. Positive to wheat and peanuts.      If she eats wheat she will get somewhat asthmatic.    She notes that since cutting out oats and nuts she has had improvement in her eczema. Her mother did have a packet of stuff that she was allergic to.             Objective    Temp 97.8  F (36.6  C)   Resp 16   Ht 1.708 m (5' 7.25\")   BMI 25.50 kg/m    Body mass index is 25.5 kg/m .    Review of Systems  Constitutional, HEENT, cardiovascular, pulmonary, gi and gu systems are negative, except as otherwise noted.  Physical Exam   GENERAL: alert and no distress  NECK: no adenopathy, no asymmetry, masses, or scars  RESP: lungs clear to auscultation - no rales, rhonchi or wheezes  CV: regular rate and rhythm, normal S1 S2, no S3 or S4, no murmur, click or rub, no peripheral edema  MS: no gross musculoskeletal defects noted, no edema  PSYCH: mentation appears normal, affect normal/bright      Signed Electronically by: Farheen Robertson MD    "

## 2024-01-17 NOTE — LETTER
My Asthma Action Plan    Name: Estela Ramirez   YOB: 1985  Date: 1/17/2024   My doctor: Farheen Robertson MD   My clinic: [facility]        My Control Medicine: Fluticasone propionate + salmeterol (Advair Diskus or Wixela Inhub) -  100/50 mcg 1 puff two times daily  My Rescue Medicine: Albuterol (Proair/Ventolin/Proventil HFA) 2-4 puffs EVERY 4 HOURS as needed. Use a spacer if recommended by your provider.   My Asthma Severity:   Mild Persistent  Know your asthma triggers: upper respiratory infections, exercise or sports, cold air, and allergens               GREEN ZONE   Good Control  I feel good  No cough or wheeze  Can work, sleep and play without asthma symptoms       Take your asthma control medicine every day.     If exercise triggers your asthma, take your rescue medication  15 minutes before exercise or sports, and  During exercise if you have asthma symptoms  Spacer to use with inhaler: If you have a spacer, make sure to use it with your inhaler             YELLOW ZONE Getting Worse  I have ANY of these:  I do not feel good  Cough or wheeze  Chest feels tight  Wake up at night   Keep taking your Green Zone medications  Start taking your rescue medicine:  every 20 minutes for up to 1 hour. Then every 4 hours for 24-48 hours.  If you stay in the Yellow Zone for more than 12-24 hours, contact your doctor.  If you do not return to the Green Zone in 12-24 hours or you get worse, start taking your oral steroid medicine if prescribed by your provider.           RED ZONE Medical Alert - Get Help  I have ANY of these:  I feel awful  Medicine is not helping  Breathing getting harder  Trouble walking or talking  Nose opens wide to breathe       Take your rescue medicine NOW  If your provider has prescribed an oral steroid medicine, start taking it NOW  Call your doctor NOW  If you are still in the Red Zone after 20 minutes and you have not reached your doctor:  Take your  rescue medicine again and  Call 911 or go to the emergency room right away    See your regular doctor within 2 weeks of an Emergency Room or Urgent Care visit for follow-up treatment.          Annual Reminders:  Meet with Asthma Educator,  Flu Shot in the Fall, consider Pneumonia Vaccination for patients with asthma (aged 19 and older).    Pharmacy:    [facility] - [place], [place] - [address] [id number] - [facility] [address] AT [facility] [address]    Electronically signed by Farheen Robertson MD   Date: 01/17/24                      Asthma Triggers  How To Control Things That Make Your Asthma Worse    Triggers are things that make your asthma worse.  Look at the list below to help you find your triggers and what you can do about them.  You can help prevent asthma flare-ups by staying away from your triggers.      Trigger                                                          What you can do   Cigarette Smoke  Tobacco smoke can make asthma worse. Do not allow smoking in your home, car or around you.  Be sure no one smokes at a child s day care or school.  If you smoke, ask your health care provider for ways to help you quit.  Ask family members to quit too.  Ask your health care provider for a referral to Quit Plan to help you quit smoking, or call 0-556-376-PLAN.     Colds, Flu, Bronchitis  These are common triggers of asthma. Wash your hands often.  Don t touch your eyes, nose or mouth.  Get a flu shot every year.     Dust Mites  These are tiny bugs that live in cloth or carpet. They are too small to see. Wash sheets and blankets in hot water every week.   Encase pillows and mattress in dust mite proof covers.  Avoid having carpet if you can. If you have carpet, vacuum weekly.   Use a dust mask and HEPA vacuum.   Pollen and Outdoor Mold  Some people are allergic to trees, grass, or weed pollen, or molds. Try to keep your  windows closed.  Limit time out doors when pollen count is high.   Ask you health care provider about taking medicine during allergy season.     Animal Dander  Some people are allergic to skin flakes, urine or saliva from pets with fur or feathers. Keep pets with fur or feathers out of your home.    If you can t keep the pet outdoors, then keep the pet out of your bedroom.  Keep the bedroom door closed.  Keep pets off cloth furniture and away from stuffed toys.     Mice, Rats, and Cockroaches   Some people are allergic to the waste from these pests.   Cover food and garbage.  Clean up spills and food crumbs.  Store grease in the refrigerator.   Keep food out of the bedroom.   Indoor Mold  This can be a trigger if your home has high moisture. Fix leaking faucets, pipes, or other sources of water.   Clean moldy surfaces.  Dehumidify basement if it is damp and smelly.   Smoke, Strong Odors, and Sprays  These can reduce air quality. Stay away from strong odors and sprays, such as perfume, powder, hair spray, paints, smoke incense, paint, cleaning products, candles and new carpet.   Exercise or Sports  Some people with asthma have this trigger. Be active!  Ask your doctor about taking medicine before sports or exercise to prevent symptoms.    Warm up for 5-10 minutes before and after sports or exercise.     Other Triggers of Asthma  Cold air:  Cover your nose and mouth with a scarf.  Sometimes laughing or crying can be a trigger.  Some medicines and food can trigger asthma.

## 2024-01-18 LAB
A ALTERNATA IGE QN: 10.6 KU(A)/L
ALLERGEN DANDELION: <0.1 KU(A)/L
ALLERGEN HOUSE DUST GREER: 2.68 KU(A)/L
ALLERGEN OLIVE TREE: 0.13 KU(A)/L
ALMOND IGE QN: <0.1 KU(A)/L
C HERBARUM IGE QN: 36.3 KU(A)/L
CARROT IGE QN: <0.1 KU(A)/L
CASHEW NUT IGE QN: <0.1 KU(A)/L
CAT DANDER IGG QN: 3.8 KU(A)/L
CEDAR IGE QN: 0.28 KU(A)/L
COCKSFOOT IGE QN: <0.1 KU(A)/L
CODFISH IGE QN: <0.1 KU(A)/L
COMMON RAGWEED IGE QN: 9.77 KU(A)/L
CORN IGE QN: 0.11 KU(A)/L
COTTONWOOD IGE QN: <0.1 KU(A)/L
COW MILK IGE QN: <0.1 KU(A)/L
D FARINAE IGE QN: 1.79 KU(A)/L
DOG DANDER+EPITH IGE QN: 6.42 KU(A)/L
EGG WHITE IGE QN: <0.1 KU(A)/L
GIANT RAGWEED IGE QN: 4.63 KU(A)/L
HAZELNUT IGE QN: <0.1 KU(A)/L
IGE SERPL-ACNC: 433 KU/L (ref 0–114)
KENT BLUE GRASS IGE QN: <0.1 KU(A)/L
MAPLE IGE QN: 1.34 KU(A)/L
MEADOW FESCUE IGE QN: <0.1 KU(A)/L
OAT IGE QN: 0.94 KU(A)/L
PEANUT IGE QN: <0.1 KU(A)/L
PER RYE GRASS IGE QN: <0.1 KU(A)/L
RICE IGE QN: 0.11 KU(A)/L
SALMON IGE QN: <0.1 KU(A)/L
SALTWORT IGE QN: <0.1 KU(A)/L
SCALLOP IGE QN: <0.1 KU(A)/L
SESAME SEED IGE QN: 0.17 KU(A)/L
SHRIMP IGE QN: 0.25 KU(A)/L
SOYBEAN IGE QN: <0.1 KU(A)/L
TIMOTHY IGE QN: 2.11 KU(A)/L
TOMATO IGE QN: <0.1 KU(A)/L
TUNA IGE QN: <0.1 KU(A)/L
WALNUT IGE QN: 0.25 KU(A)/L
WHEAT IGE QN: 0.21 KU(A)/L
WHITE ELM IGE QN: <0.1 KU(A)/L

## 2024-03-13 SDOH — HEALTH STABILITY: PHYSICAL HEALTH: ON AVERAGE, HOW MANY MINUTES DO YOU ENGAGE IN EXERCISE AT THIS LEVEL?: 30 MIN

## 2024-03-13 SDOH — HEALTH STABILITY: PHYSICAL HEALTH: ON AVERAGE, HOW MANY DAYS PER WEEK DO YOU ENGAGE IN MODERATE TO STRENUOUS EXERCISE (LIKE A BRISK WALK)?: 3 DAYS

## 2024-03-13 ASSESSMENT — ASTHMA QUESTIONNAIRES
ACT_TOTALSCORE: 14
QUESTION_2 LAST FOUR WEEKS HOW OFTEN HAVE YOU HAD SHORTNESS OF BREATH: MORE THAN ONCE A DAY
QUESTION_1 LAST FOUR WEEKS HOW MUCH OF THE TIME DID YOUR ASTHMA KEEP YOU FROM GETTING AS MUCH DONE AT WORK, SCHOOL OR AT HOME: A LITTLE OF THE TIME
QUESTION_3 LAST FOUR WEEKS HOW OFTEN DID YOUR ASTHMA SYMPTOMS (WHEEZING, COUGHING, SHORTNESS OF BREATH, CHEST TIGHTNESS OR PAIN) WAKE YOU UP AT NIGHT OR EARLIER THAN USUAL IN THE MORNING: NOT AT ALL
QUESTION_5 LAST FOUR WEEKS HOW WOULD YOU RATE YOUR ASTHMA CONTROL: SOMEWHAT CONTROLLED
QUESTION_4 LAST FOUR WEEKS HOW OFTEN HAVE YOU USED YOUR RESCUE INHALER OR NEBULIZER MEDICATION (SUCH AS ALBUTEROL): THREE OR MORE TIMES PER DAY
ACT_TOTALSCORE: 14

## 2024-03-13 ASSESSMENT — SOCIAL DETERMINANTS OF HEALTH (SDOH): HOW OFTEN DO YOU GET TOGETHER WITH FRIENDS OR RELATIVES?: ONCE A WEEK

## 2024-03-20 ENCOUNTER — OFFICE VISIT (OUTPATIENT)
Dept: FAMILY MEDICINE | Facility: CLINIC | Age: 39
End: 2024-03-20
Payer: COMMERCIAL

## 2024-03-20 VITALS
BODY MASS INDEX: 25.27 KG/M2 | WEIGHT: 161 LBS | DIASTOLIC BLOOD PRESSURE: 62 MMHG | HEIGHT: 67 IN | TEMPERATURE: 98 F | SYSTOLIC BLOOD PRESSURE: 98 MMHG | OXYGEN SATURATION: 97 % | RESPIRATION RATE: 17 BRPM | HEART RATE: 80 BPM

## 2024-03-20 DIAGNOSIS — J45.909 UNCOMPLICATED ASTHMA, UNSPECIFIED ASTHMA SEVERITY, UNSPECIFIED WHETHER PERSISTENT: ICD-10-CM

## 2024-03-20 DIAGNOSIS — Z00.00 ENCOUNTER FOR ROUTINE HISTORY AND PHYSICAL EXAMINATION OF ADULT: Primary | ICD-10-CM

## 2024-03-20 DIAGNOSIS — R09.81 NASAL CONGESTION: ICD-10-CM

## 2024-03-20 PROCEDURE — 99395 PREV VISIT EST AGE 18-39: CPT | Performed by: FAMILY MEDICINE

## 2024-03-20 RX ORDER — FLUTICASONE PROPIONATE AND SALMETEROL 100; 50 UG/1; UG/1
POWDER RESPIRATORY (INHALATION)
Qty: 180 EACH | Refills: 3 | Status: SHIPPED | OUTPATIENT
Start: 2024-03-20 | End: 2024-07-03

## 2024-03-20 RX ORDER — ALBUTEROL SULFATE 90 UG/1
AEROSOL, METERED RESPIRATORY (INHALATION)
Qty: 54 G | Refills: 11 | Status: SHIPPED | OUTPATIENT
Start: 2024-03-20 | End: 2024-07-03

## 2024-03-20 RX ORDER — OMEGA-3 FATTY ACIDS/FISH OIL 300-1000MG
200 CAPSULE ORAL EVERY 4 HOURS PRN
COMMUNITY
End: 2024-07-08

## 2024-03-20 RX ORDER — MONTELUKAST SODIUM 10 MG/1
10 TABLET ORAL AT BEDTIME
Qty: 90 TABLET | Refills: 3 | Status: SHIPPED | OUTPATIENT
Start: 2024-03-20 | End: 2024-07-03

## 2024-03-20 NOTE — PROGRESS NOTES
Preventive Care Visit  [facility]  Farheen Robertson MD, Family Medicine  Mar 20, 2024      Assessment & Plan     Encounter for routine history and physical examination of adult  -Routine health maintenance discussion:  No smoking, limited alcohol (7 or less servings per week), 5 fruits/veg servings per day, 200 minutes of exercise per week.  Daily calcium/vitamin D guidelines, bone health, colon cancer screening beginning at age 50.  Accident avoidance, sun screen.     Nasal congestion  -Worsening symptoms at this time, does have upcoming allergy appointment. Will have her start Singulair and she can consider doing Flonase as well.   - montelukast (SINGULAIR) 10 MG tablet  Dispense: 90 tablet; Refill: 3    Uncomplicated asthma, unspecified asthma severity, unspecified whether persistent  -Flared due to her allergies, start BID inhaler for now, and if not improving with this and the singulair will consider increasing her Wixela.   - fluticasone-salmeterol (WIXELA INHUB) 100-50 MCG/ACT inhaler  Dispense: 180 each; Refill: 3  - albuterol (PROAIR HFA/PROVENTIL HFA/VENTOLIN HFA) 108 (90 Base) MCG/ACT inhaler  Dispense: 54 g; Refill: 11      Patient has been advised of split billing requirements and indicates understanding: Yes        Counseling  Appropriate preventive services were discussed with this patient, including applicable screening as appropriate for fall prevention, nutrition, physical activity, Tobacco-use cessation, weight loss and cognition.  Checklist reviewing preventive services available has been given to the patient.  Reviewed patient's diet, addressing concerns and/or questions.   She is at risk for lack of exercise and has been provided with information to increase physical activity for the benefit of her well-being.   The patient was instructed to see the dentist every 6 months.           Nuha Palmer is a 38 year old, presenting for the following:  Physical         3/20/2024     9:25 AM   Additional Questions   Roomed by Amrita        Health Care Directive  Patient does not have a Health Care Directive or Living Will: Discussed advance care planning with patient; information given to patient to review.    HPI    She is doing well for the most part. She does have some struggles with her daughter going to school.     Her allergies are pretty bad right now with her asthma. She is not doing the morning dose of her inhaler and is not currently taking her Singulair.       3/13/2024   General Health   How would you rate your overall physical health? Good   Feel stress (tense, anxious, or unable to sleep) To some extent   (!) STRESS CONCERN      3/13/2024   Nutrition   Three or more servings of calcium each day? (!) NO   Diet: Regular (no restrictions)   How many servings of fruit and vegetables per day? (!) 0-1   How many sweetened beverages each day? 0-1         3/13/2024   Exercise   Days per week of moderate/strenous exercise 3 days   Average minutes spent exercising at this level 30 min         3/13/2024   Social Factors   Frequency of gathering with friends or relatives Once a week   Worry food won't last until get money to buy more No   Food not last or not have enough money for food? No   Do you have housing?  Yes   Are you worried about losing your housing? No   Lack of transportation? No   Unable to get utilities (heat,electricity)? No         3/13/2024   Dental   Dentist two times every year? (!) NO         3/13/2024   TB Screening   Were you born outside of the [place]? No           Today's PHQ-2 Score:       1/17/2024     9:34 AM   PHQ-2 ( 1999 [employer])   Q1: Little interest or pleasure in doing things 0   Q2: Feeling down, depressed or hopeless 0   PHQ-2 Score 0   Q1: Little interest or pleasure in doing things Not at all   Q2: Feeling down, depressed or hopeless Not at all   PHQ-2 Score 0         3/13/2024   Substance Use   If I could quit smoking, I would Somewhat agree   I  "want to quit somking, worry about health affects Completely agree   Willing to make a plan to quit smoking Neutral   Willing to cut down before quitting Somewhat agree   Alcohol more than 3/day or more than 7/wk No   Do you use any other substances recreationally? No     Social History     Tobacco Use    Smoking status: Every Day     Types: Cigarettes             3/13/2024   Breast Cancer Screening   Family history of breast, colon, or ovarian cancer? No / Unknown      Mammogram Screening - Patient under 40 years of age: Routine Mammogram Screening not recommended.         3/13/2024   STI Screening   New sexual partner(s) since last STI/HIV test? No     History of abnormal Pap smear: NO - age 30-65 PAP every 5 years with negative HPV co-testing recommended        Latest Ref Rng & Units 3/2/2022     3:59 PM 10/13/2017    10:58 AM 9/15/2015    12:05 PM   PAP / HPV   PAP  Negative for Intraepithelial Lesion or Malignancy (NILM)  Negative for squamous intraepithelial lesion or malignancy  Electronically signed by Abby Hernández CT (ASCP) on 10/19/2017 at  1:46 PM    Negative for squamous intraepithelial lesion or malignancy  Electronically signed by Caity Carrillo CT (ASCP) on 9/29/2015 at  7:44 AM      HPV 16 DNA Negative Negative      HPV 18 DNA Negative Negative      Other HR HPV Negative Negative              3/13/2024   Contraception/Family Planning   Questions about contraception or family planning No        Reviewed and updated as needed this visit by Provider   Tobacco  Allergies  Meds  Problems  Med Hx  Surg Hx  Fam Hx              Review of Systems  Constitutional, HEENT, cardiovascular, pulmonary, gi and gu systems are negative, except as otherwise noted.     Objective    Exam  BP 98/62   Pulse 80   Temp 98  F (36.7  C)   Resp 17   Ht 1.708 m (5' 7.25\")   Wt 73 kg (161 lb)   LMP 03/18/2024   SpO2 97%   BMI 25.03 kg/m     Estimated body mass index is 25.03 kg/m  as calculated from " "the following:    Height as of this encounter: 1.708 m (5' 7.25\").    Weight as of this encounter: 73 kg (161 lb).    Physical Exam  GENERAL: alert and no distress  EYES: Eyes grossly normal to inspection, PERRL and conjunctivae and sclerae normal  HENT: ear canals and TM's normal, nose and mouth without ulcers or lesions  NECK: no adenopathy, no asymmetry, masses, or scars  RESP: lungs clear to auscultation - no rales, rhonchi or wheezes  CV: regular rate and rhythm, normal S1 S2, no S3 or S4, no murmur, click or rub, no peripheral edema  ABDOMEN: soft, nontender, no hepatosplenomegaly, no masses and bowel sounds normal  MS: no gross musculoskeletal defects noted, no edema  SKIN: no suspicious lesions or rashes  NEURO: Normal strength and tone, mentation intact and speech normal  PSYCH: mentation appears normal, affect normal/bright        Signed Electronically by: Farheen Robertson MD    "

## 2024-04-05 ENCOUNTER — E-VISIT (OUTPATIENT)
Dept: FAMILY MEDICINE | Facility: CLINIC | Age: 39
End: 2024-04-05
Payer: OTHER GOVERNMENT

## 2024-04-05 DIAGNOSIS — Z20.828 EXPOSURE TO INFLUENZA: Primary | ICD-10-CM

## 2024-04-05 PROCEDURE — 99421 OL DIG E/M SVC 5-10 MIN: CPT | Performed by: FAMILY MEDICINE

## 2024-04-05 RX ORDER — PREDNISONE 10 MG/1
TABLET ORAL
Qty: 30 TABLET | Refills: 0 | Status: SHIPPED | OUTPATIENT
Start: 2024-04-05 | End: 2024-07-08

## 2024-04-05 RX ORDER — BENZONATATE 200 MG/1
200 CAPSULE ORAL 3 TIMES DAILY PRN
Qty: 20 CAPSULE | Refills: 0 | Status: SHIPPED | OUTPATIENT
Start: 2024-04-05 | End: 2024-07-08

## 2024-04-05 NOTE — TELEPHONE ENCOUNTER
Provider E-Visit time total (minutes): 3      Assessment:   The primary encounter diagnosis was (Z20.828) Exposure to influenza  (primary encounter diagnosis)  Comment:   Plan: benzonatate (TESSALON) 200 MG capsule,         predniSONE (DELTASONE) 10 MG tablet              Plan:   No orders of the defined types were placed in this encounter.      Patient Instructions   Thank you for choosing us for your care. I have placed an order for a prescription so that you can start treatment.     I sent in the prednisone as well as the cough medication. I'd start with the cough medication today and if that's not helping start the prednisone tomorrow. I things are worsening over the weekend please go in for a chest xray.     I hope you feel better soon!    View your full visit summary for details by clicking on the link below. Your pharmacist will able to address any questions you may have about the medication.     If you're not feeling better within 5-7 days, please schedule an appointment.  You can schedule an appointment right here in [facility], or call [phone]  If the visit is for the same symptoms as your eVisit, we'll refund the cost of your eVisit if seen within seven days.        Subjective:   Estela Ramirez is a 38 year old female who submitted an eVisit request for evaluation of   Chief Complaint   Patient presents with    Cough     Entered automatically based on patient selection in [facility].       See the questionnaire and message section of encounter report for information related to history of present illness and review of systems.    Portions of the patient's history were reviewed and updated as appropriate.     Objective:   No exam performed today, patient submitted as eVisit.

## 2024-04-05 NOTE — PATIENT INSTRUCTIONS
Thank you for choosing us for your care. I have placed an order for a prescription so that you can start treatment.     I sent in the prednisone as well as the cough medication. I'd start with the cough medication today and if that's not helping start the prednisone tomorrow. I things are worsening over the weekend please go in for a chest xray.     I hope you feel better soon!    View your full visit summary for details by clicking on the link below. Your pharmacist will able to address any questions you may have about the medication.     If you're not feeling better within 5-7 days, please schedule an appointment.  You can schedule an appointment right here in [facility], or call [phone]  If the visit is for the same symptoms as your eVisit, we'll refund the cost of your eVisit if seen within seven days.

## 2024-05-28 ENCOUNTER — TELEPHONE (OUTPATIENT)
Dept: ALLERGY | Facility: CLINIC | Age: 39
End: 2024-05-28
Payer: COMMERCIAL

## 2024-05-28 NOTE — TELEPHONE ENCOUNTER
[facility]    Phone Message    May a detailed message be left on voicemail: yes     Reason for Call: Other: Estela has a June 04th appt scheduled but found out recently she is pregnant is it safe to do testing? Please call to advise.     Action Taken: Other: [facility]    Travel Screening: Not Applicable

## 2024-05-29 DIAGNOSIS — Z32.01 PREGNANCY TEST POSITIVE: Primary | ICD-10-CM

## 2024-05-29 RX ORDER — PRENATAL VIT/IRON FUM/FOLIC AC 27MG-0.8MG
1 TABLET ORAL DAILY
Qty: 90 TABLET | Refills: 3 | Status: SHIPPED | OUTPATIENT
Start: 2024-05-29

## 2024-05-29 NOTE — PROGRESS NOTES
Patient just found out she is pregnant, has had coverage of her prenatals in the past by insurance. Will sen in. Has appointment scheduled.

## 2024-06-04 ENCOUNTER — OFFICE VISIT (OUTPATIENT)
Dept: ALLERGY | Facility: CLINIC | Age: 39
End: 2024-06-04
Attending: FAMILY MEDICINE
Payer: COMMERCIAL

## 2024-06-04 VITALS
HEART RATE: 84 BPM | OXYGEN SATURATION: 97 % | RESPIRATION RATE: 18 BRPM | BODY MASS INDEX: 25.26 KG/M2 | WEIGHT: 162.5 LBS

## 2024-06-04 DIAGNOSIS — T78.1XXD ADVERSE FOOD REACTION, SUBSEQUENT ENCOUNTER: ICD-10-CM

## 2024-06-04 DIAGNOSIS — J30.1 SEASONAL ALLERGIC RHINITIS DUE TO POLLEN: ICD-10-CM

## 2024-06-04 DIAGNOSIS — J45.909 UNCOMPLICATED ASTHMA, UNSPECIFIED ASTHMA SEVERITY, UNSPECIFIED WHETHER PERSISTENT: ICD-10-CM

## 2024-06-04 DIAGNOSIS — T78.1XXA POLLEN-FOOD ALLERGY, INITIAL ENCOUNTER: Primary | ICD-10-CM

## 2024-06-04 DIAGNOSIS — R76.9 POSITIVE ALLERGY TEST: ICD-10-CM

## 2024-06-04 PROCEDURE — 99204 OFFICE O/P NEW MOD 45 MIN: CPT | Mod: 25 | Performed by: ALLERGY & IMMUNOLOGY

## 2024-06-04 PROCEDURE — 95004 PERQ TESTS W/ALRGNC XTRCS: CPT | Performed by: ALLERGY & IMMUNOLOGY

## 2024-06-04 RX ORDER — EPINEPHRINE 0.3 MG/.3ML
INJECTION SUBCUTANEOUS
Qty: 2 EACH | Refills: 0 | Status: SHIPPED | OUTPATIENT
Start: 2024-06-04

## 2024-06-04 NOTE — PATIENT INSTRUCTIONS
Avoid oats for now    Carry epinephrine for now    Oat challenge once delivered    Oral allergy syndrome    Food allergy--immediate hives, swelling, shortness of breath    Can have sensitivity or intolerance

## 2024-06-04 NOTE — LETTER
6/4/2024      Estela Ramirez  [address] [place] [address]      Dear Colleague,    Thank you for referring your patient, Estela Ramirez, to the [facility]. Please see a copy of my visit note below.          Subjective  Estela is a 38 year old, presenting for the following health issues:  Allergy Consult (Concern for food allergy)    HPI     Chief complaint: Concern for food allergy history of present illness: This is a pleasant 38-year-old woman I was asked to see for evaluation of allergies by Dr. Robertson.    Patient states that the child she was tested for allergies and positive to multiple aeroallergens and foods.  She states recently she has noted some reactions to foods and so she asked to have this testing redone.  She was tested via a food panel and specific IgE to various other foods.  She notes that when she ate a wheat containing food with carrots that she had itchy throat some breathing difficulty and itching.  This resolved with Benadryl.  She did not need epinephrine.  She does have asthma she is she took her asthma inhaler.  She continues to eat wheat now but has eliminated oats and nuts as well as carrots.  She notes that she eats carrots and a cooked or processed farm this does not bother her.  Total IgE was 433 on her testing.  She had small positives to wheat walnut and shrimp as well as sesame and corn as well as rice that many of her results were less than 0.30.  Both was positive at 0.94.  She does note environmental allergies that are well-controlled with Aller tech and Singulair.  She also takes Wixela for her asthma and she states her symptoms are well-controlled with no current cough, wheeze or shortness of breath.  Of note, she found out she was recently pregnant.    Past medical history: History of eczema and asthma, history of back surgery    Social history: She does have dogs at home, former smoker, secondhand smoke exposure    Family history: Positive for  allergies in her mother      Objective   Pulse 84   Resp 18   Wt 73.7 kg (162 lb 8 oz)   SpO2 97%   BMI 25.26 kg/m    Body mass index is 25.26 kg/m .  Physical Exam     Gen: Pleasant female not in acute distress  HEENT: Eyes no erythema of the bulbar or palpebral conjunctiva, no edema. Ears: No deformities or lesions. Nose: No congestion,  Mouth: Throat clear, no lip or tongue edema.   Neck: No masses lesions or swelling  Respiratory: No coughing with breathing, no retractions  Lymph: No visible supraclavicular or cervical lymphadenopathy  Skin:  some eczema at the flexor surface of the elbows  Psych: Alert and appropriate for age      At today s visit the patient/parent and I engaged in an informed consent discussion about allergy testing.  We discussed skin testing, blood testing,  and the alternative of not undergoing any testing. The patient/ parent has a preference for skin testing. We then discussed the risks and benefits of skin testing.  The patient/ parent understands skin testing risks can include, but are not limited to, urticaria, angioedema, shortness of breath, and severe anaphylaxis.  The benefits include, but are not limited, to evaluation for allergens causing symptoms.  After answering the patients/parents questions they have agreed to proceed with skin testing.    14 percutaneous test were undertaken to oat pollens implicated in oral allergy syndrome and tree nuts as she does eat peanut.  Negative test to tree nuts.  4 mm response to Michael grass mugwort and oats.  Please see scanned photograph.      Impression report and plan:  1.  Oral allergy syndrome  2.  Adverse food reaction    I stated that IgE-mediated allergy consist of immediate hives, swelling and shortness of breath.  Many of her symptoms are more consistent with oral allergy syndrome and given her negative tree nut test today.  The fact that she is eating wheat speaks against an IgE mediated allergy.  She did eat oats frequently  until she had this reaction.  Given that she is pregnant I am unable to do a food challenge.  Do recommend a food challenge once she has delivered.  She will contact me and I would suggest skin testing prior to the challenge and then doing the challenge that day.  For now, I would consider just carry an epinephrine device.  I did explain that food testing via specific IgE can be somewhat inaccurate.  Typically this is causes false positives.  She will let me know if she has any further reactions.    Signed Electronically by: Reena TONEY MD        Again, thank you for allowing me to participate in the care of your patient.        Sincerely,        Reena TONEY MD

## 2024-06-04 NOTE — LETTER
ANAPHYLAXIS ALLERGY PLAN    Name: Estela Ramirez      :  1985    Allergy to:      Weight: 162 lbs 8 oz           Asthma:  Yes  (higher risk for a severe reaction)      Do not depend on antihistamines or inhalers (bronchodilators) to treat a severe reaction; USE EPINEPHRINE      MEDICATIONS/DOSES  Epinephrine:    Epinephrine dose:  0.3 mg IM  Antihistamine:  Zyrtec (Cetirizine)  Antihistamine dose:  10 mg  Other (e.g., inhaler-bronchodilator if wheezing):  Albuterol 2 puffs       ANAPHYLAXIS ALLERGY PLAN (Page 2)  Patient:  Estela Ramirez  :  1985         Electronically signed on 2024 by:  Reena TONEY MD  Parent/Guardian Authorization Signature:  ___________________________ Date:    FORM PROVIDED [employer] OF FOOD ALLERGY RESEARCH & EDUCATION (FARE) ([email or web address]) 2017

## 2024-06-04 NOTE — PROGRESS NOTES
Nuha Palmer is a 38 year old, presenting for the following health issues:  Allergy Consult (Concern for food allergy)    HPI     Chief complaint: Concern for food allergy history of present illness: This is a pleasant 38-year-old woman I was asked to see for evaluation of allergies by Dr. Robertson.    Patient states that the child she was tested for allergies and positive to multiple aeroallergens and foods.  She states recently she has noted some reactions to foods and so she asked to have this testing redone.  She was tested via a food panel and specific IgE to various other foods.  She notes that when she ate a wheat containing food with carrots that she had itchy throat some breathing difficulty and itching.  This resolved with Benadryl.  She did not need epinephrine.  She does have asthma she is she took her asthma inhaler.  She continues to eat wheat now but has eliminated oats and nuts as well as carrots.  She notes that she eats carrots and a cooked or processed farm this does not bother her.  Total IgE was 433 on her testing.  She had small positives to wheat walnut and shrimp as well as sesame and corn as well as rice that many of her results were less than 0.30.  Both was positive at 0.94.  She does note environmental allergies that are well-controlled with Aller tech and Singulair.  She also takes Wixela for her asthma and she states her symptoms are well-controlled with no current cough, wheeze or shortness of breath.  Of note, she found out she was recently pregnant.    Past medical history: History of eczema and asthma, history of back surgery    Social history: She does have dogs at home, former smoker, secondhand smoke exposure    Family history: Positive for allergies in her mother      Objective    Pulse 84   Resp 18   Wt 73.7 kg (162 lb 8 oz)   SpO2 97%   BMI 25.26 kg/m    Body mass index is 25.26 kg/m .  Physical Exam     Gen: Pleasant female not in acute distress  HEENT: Eyes no  erythema of the bulbar or palpebral conjunctiva, no edema. Ears: No deformities or lesions. Nose: No congestion,  Mouth: Throat clear, no lip or tongue edema.   Neck: No masses lesions or swelling  Respiratory: No coughing with breathing, no retractions  Lymph: No visible supraclavicular or cervical lymphadenopathy  Skin:  some eczema at the flexor surface of the elbows  Psych: Alert and appropriate for age      At today s visit the patient/parent and I engaged in an informed consent discussion about allergy testing.  We discussed skin testing, blood testing,  and the alternative of not undergoing any testing. The patient/ parent has a preference for skin testing. We then discussed the risks and benefits of skin testing.  The patient/ parent understands skin testing risks can include, but are not limited to, urticaria, angioedema, shortness of breath, and severe anaphylaxis.  The benefits include, but are not limited, to evaluation for allergens causing symptoms.  After answering the patients/parents questions they have agreed to proceed with skin testing.    14 percutaneous test were undertaken to oat pollens implicated in oral allergy syndrome and tree nuts as she does eat peanut.  Negative test to tree nuts.  4 mm response to Michael grass mugwort and oats.  Please see scanned photograph.      Impression report and plan:  1.  Oral allergy syndrome  2.  Adverse food reaction    I stated that IgE-mediated allergy consist of immediate hives, swelling and shortness of breath.  Many of her symptoms are more consistent with oral allergy syndrome and given her negative tree nut test today.  The fact that she is eating wheat speaks against an IgE mediated allergy.  She did eat oats frequently until she had this reaction.  Given that she is pregnant I am unable to do a food challenge.  Do recommend a food challenge once she has delivered.  She will contact me and I would suggest skin testing prior to the challenge and  then doing the challenge that day.  For now, I would consider just carry an epinephrine device.  I did explain that food testing via specific IgE can be somewhat inaccurate.  Typically this is causes false positives.  She will let me know if she has any further reactions.    Signed Electronically by: Reena TONEY MD

## 2024-07-02 LAB
ABO/RH(D): NORMAL
ANTIBODY SCREEN: NEGATIVE
SPECIMEN EXPIRATION DATE: NORMAL

## 2024-07-03 ENCOUNTER — PRENATAL OFFICE VISIT (OUTPATIENT)
Dept: FAMILY MEDICINE | Facility: CLINIC | Age: 39
End: 2024-07-03
Payer: COMMERCIAL

## 2024-07-03 VITALS
OXYGEN SATURATION: 99 % | WEIGHT: 167.5 LBS | DIASTOLIC BLOOD PRESSURE: 66 MMHG | TEMPERATURE: 98.1 F | HEART RATE: 76 BPM | BODY MASS INDEX: 26.29 KG/M2 | RESPIRATION RATE: 16 BRPM | HEIGHT: 67 IN | SYSTOLIC BLOOD PRESSURE: 106 MMHG

## 2024-07-03 DIAGNOSIS — E55.9 VITAMIN D DEFICIENCY: ICD-10-CM

## 2024-07-03 DIAGNOSIS — R09.81 NASAL CONGESTION: ICD-10-CM

## 2024-07-03 DIAGNOSIS — J45.909 UNCOMPLICATED ASTHMA, UNSPECIFIED ASTHMA SEVERITY, UNSPECIFIED WHETHER PERSISTENT: ICD-10-CM

## 2024-07-03 DIAGNOSIS — O09.529 SUPERVISION OF HIGH-RISK PREGNANCY OF ELDERLY MULTIGRAVIDA: Primary | ICD-10-CM

## 2024-07-03 LAB
ALBUMIN UR-MCNC: NEGATIVE MG/DL
APPEARANCE UR: CLEAR
BILIRUB UR QL STRIP: NEGATIVE
COLOR UR AUTO: YELLOW
ERYTHROCYTE [DISTWIDTH] IN BLOOD BY AUTOMATED COUNT: 16.2 % (ref 10–15)
GLUCOSE UR STRIP-MCNC: NEGATIVE MG/DL
HCT VFR BLD AUTO: 38.4 % (ref 35–47)
HGB BLD-MCNC: 12.6 G/DL (ref 11.7–15.7)
HGB UR QL STRIP: NEGATIVE
KETONES UR STRIP-MCNC: NEGATIVE MG/DL
LEUKOCYTE ESTERASE UR QL STRIP: NEGATIVE
MCH RBC QN AUTO: 29.2 PG (ref 26.5–33)
MCHC RBC AUTO-ENTMCNC: 32.8 G/DL (ref 31.5–36.5)
MCV RBC AUTO: 89 FL (ref 78–100)
NITRATE UR QL: NEGATIVE
PH UR STRIP: 6 [PH] (ref 5–8)
PLATELET # BLD AUTO: 183 10E3/UL (ref 150–450)
RBC # BLD AUTO: 4.31 10E6/UL (ref 3.8–5.2)
RUBV IGG SERPL QL IA: 14.1 INDEX
RUBV IGG SERPL QL IA: POSITIVE
SP GR UR STRIP: 1.01 (ref 1–1.03)
T PALLIDUM AB SER QL: NONREACTIVE
UROBILINOGEN UR STRIP-ACNC: 0.2 E.U./DL
WBC # BLD AUTO: 8.2 10E3/UL (ref 4–11)

## 2024-07-03 PROCEDURE — 36415 COLL VENOUS BLD VENIPUNCTURE: CPT | Performed by: FAMILY MEDICINE

## 2024-07-03 PROCEDURE — 86850 RBC ANTIBODY SCREEN: CPT | Performed by: FAMILY MEDICINE

## 2024-07-03 PROCEDURE — 87591 N.GONORRHOEAE DNA AMP PROB: CPT | Performed by: FAMILY MEDICINE

## 2024-07-03 PROCEDURE — 87491 CHLMYD TRACH DNA AMP PROBE: CPT | Performed by: FAMILY MEDICINE

## 2024-07-03 PROCEDURE — 86901 BLOOD TYPING SEROLOGIC RH(D): CPT | Performed by: FAMILY MEDICINE

## 2024-07-03 PROCEDURE — 87389 HIV-1 AG W/HIV-1&-2 AB AG IA: CPT | Performed by: FAMILY MEDICINE

## 2024-07-03 PROCEDURE — 85027 COMPLETE CBC AUTOMATED: CPT | Performed by: FAMILY MEDICINE

## 2024-07-03 PROCEDURE — 87340 HEPATITIS B SURFACE AG IA: CPT | Performed by: FAMILY MEDICINE

## 2024-07-03 PROCEDURE — 87086 URINE CULTURE/COLONY COUNT: CPT | Performed by: FAMILY MEDICINE

## 2024-07-03 PROCEDURE — 86762 RUBELLA ANTIBODY: CPT | Performed by: FAMILY MEDICINE

## 2024-07-03 PROCEDURE — 99213 OFFICE O/P EST LOW 20 MIN: CPT | Performed by: FAMILY MEDICINE

## 2024-07-03 PROCEDURE — 81003 URINALYSIS AUTO W/O SCOPE: CPT | Performed by: FAMILY MEDICINE

## 2024-07-03 PROCEDURE — 86900 BLOOD TYPING SEROLOGIC ABO: CPT | Performed by: FAMILY MEDICINE

## 2024-07-03 PROCEDURE — 82306 VITAMIN D 25 HYDROXY: CPT | Performed by: FAMILY MEDICINE

## 2024-07-03 PROCEDURE — 86780 TREPONEMA PALLIDUM: CPT | Performed by: FAMILY MEDICINE

## 2024-07-03 PROCEDURE — 86803 HEPATITIS C AB TEST: CPT | Performed by: FAMILY MEDICINE

## 2024-07-03 RX ORDER — FLUTICASONE PROPIONATE AND SALMETEROL 100; 50 UG/1; UG/1
POWDER RESPIRATORY (INHALATION)
Qty: 180 EACH | Refills: 3 | Status: SHIPPED | OUTPATIENT
Start: 2024-07-03

## 2024-07-03 RX ORDER — ALBUTEROL SULFATE 90 UG/1
AEROSOL, METERED RESPIRATORY (INHALATION)
Qty: 54 G | Refills: 11 | Status: SHIPPED | OUTPATIENT
Start: 2024-07-03

## 2024-07-03 RX ORDER — MONTELUKAST SODIUM 10 MG/1
10 TABLET ORAL AT BEDTIME
Qty: 90 TABLET | Refills: 3 | Status: SHIPPED | OUTPATIENT
Start: 2024-07-03

## 2024-07-03 ASSESSMENT — PAIN SCALES - GENERAL: PAINLEVEL: NO PAIN (0)

## 2024-07-03 NOTE — PATIENT INSTRUCTIONS
Nonprescription Medications Thought To Be Safe In Pregnancy (take medication according to package directions)     NAUSEA AND MOTION SICKNESS   Vitamin C 500 mg, once a day with food   Vitamin B6 50 mg, one three times a day   Unisom tablets (not gel tabs) - 1/2 to 1 tab at bedtime; may also take 1/2 tab in the morning and mid-afternoon   Dramamine   Sea Bands   Karen tablets     CONGESTION AND COLDS   Chlortrimeton   Benadryl   Vicks Vapor Rub   Cough Drops  Avoid Robitussin and Mucinex in 1st trimester but otherwise safe during rest of pregnancy  Avoid pseudoephedrine      ALLERGIES   Alavert   Claritin   Tavist   Benadryl   Zyrtec     HEADACHES   Tylenol 325 mg 2-3 four times a day   Tylenol 500 mg 1-2 four times a day   DO NOT EXCEED 4,000 MG A DAY  DO NOT TAKE IBUPROFEN, MOTRIN, ADVIL, ASPIRIN, OR ALEVE      VAGINAL YEAST INFECTION   Monistat 3 or 7   Gyne-Lotrimin     HEMORRHOIDS   Preparation-H   Anusol   Anusol HC     HEARTBURN   Tums   Maalox (tablets or liquid)   Rolaids   Mylanta   Gaviscon   Pepcid AC  NO PEPTOBISMOL OR ALKASELTZER (contains aspirin)      DIARRHEA (do not treat for the first 24-48 hrs)   Kaopectate   Imodium AD     CONSTIPATION   Colace (Docusate Sodium) - stool softener   Jennifer-Colace (Colace + mild stimulant)   Any fiber supplement (Metamucil, Fibercon ,etc.)     GAS   Gas-X   Mylanta II with Simethicone   Mylicon     INSECT BITES   Lotions: Calamine, Caladryl, Benadryl   Oral: Benadryl tabs 25-50mg (every 6-8hrs)    Weeks 10 to 14 of Your Pregnancy: Care Instructions  It's now possible to hear the fetus's heartbeat with a special ultrasound device. And the fetus's organs are developing.    Decide about tests to check for birth defects. Think about your age, your chance of passing on a family disease, your need to know about any problems, and what you might do after you have the test results.    It's okay to exercise. Try activities such as walking or swimming. Check with your doctor  "before starting a new program.    You may feel more tired than usual.  Taking naps during the day may help.     You may feel emotional.  It might help to talk to someone.     You may have headaches.  Try lying down and putting a cool cloth over your forehead.     You can use acetaminophen (Tylenol) for pain relief.  Don't take any anti-inflammatory medicines (such as Advil, Motrin, Aleve), unless your doctor says it's okay.     You may feel a fullness or aching in your lower belly.  This can feel like the kind of cramps you might get before a period. A back rub may help.     You may need to urinate more.  Your growing uterus and changing hormones can affect your bladder.     You may feel sick to your stomach (morning sickness).  Try avoiding food and smells that make you feel sick.     Your breasts may feel different.  They may feel tender or get bigger. Your nipples may get darker. Try a bra that gives you good support.     Avoid alcohol, tobacco, and drugs (including marijuana).  If you need help quitting, talk to your doctor.     Take a daily prenatal vitamin.  Choose one with folic acid.   Follow-up care is a key part of your treatment and safety. Be sure to make and go to all appointments, and call your doctor if you are having problems. It's also a good idea to know your test results and keep a list of the medicines you take.  Where can you learn more?  Go to https://www.[employer].net/patiented  Enter E090 in the search box to learn more about \"Weeks 10 to 14 of Your Pregnancy: Care Instructions.\"  Current as of: July 10, 2023               Content Version: 14.0    [id number] [employer].   Care instructions adapted under license by your healthcare professional. If you have questions about a medical condition or this instruction, always ask your healthcare professional. [employer] disclaims any warranty or liability for your use of this information.      Nutrition During Pregnancy: " Care Instructions  Overview     Healthy eating when you are pregnant is important for you and your baby. It can help you feel well and have a successful pregnancy and delivery. During pregnancy your nutrition needs increase. Even if you have excellent eating habits, your doctor may recommend a multivitamin to make sure you get enough iron and folic acid.  You may wonder how much weight you should gain. In general, if you were at a healthy weight before you became pregnant, then you should gain between 25 and 35 pounds. If you were overweight before pregnancy, then you'll likely be advised to gain 15 to 25 pounds. If you were underweight before pregnancy, then you'll probably be advised to gain 28 to 40 pounds. Your doctor will work with you to set a weight goal that is right for you. Gaining a healthy amount of weight helps you have a healthy baby.  Follow-up care is a key part of your treatment and safety. Be sure to make and go to all appointments, and call your doctor if you are having problems. It's also a good idea to know your test results and keep a list of the medicines you take.  How can you care for yourself at home?  Eat plenty of fruits and vegetables. Include a variety of orange, yellow, and leafy dark-green vegetables every day.  Choose whole-grain bread, cereal, and pasta. Good choices include whole wheat bread, whole wheat pasta, brown rice, and oatmeal.  Get 4 or more servings of milk and milk products each day. Good choices include nonfat or low-fat milk, yogurt, and cheese. If you cannot eat milk products, you can get calcium from calcium-fortified products such as orange juice, soy milk, and tofu. Other non-milk sources of calcium include leafy green vegetables, such as broccoli, kale, mustard greens, turnip greens, bok shira, and brussels sprouts.  If you eat meat, pick lower-fat types. Good choices include lean cuts of meat and chicken or turkey without the skin.  Avoid fish that are high in  "mercury. These include shark, swordfish, maria luisa mackerel, marlin, orange roughy, and bigeye tuna, as well as tilefish from the [place].  It's okay to eat up to 8 to 12 ounces a week of fish that are low in mercury or up to 4 ounces a week of fish that have medium levels of mercury. Some fish that are low in mercury are salmon, shrimp, canned light tuna, cod, and tilapia. Some fish that have medium levels of mercury are halibut and white albacore tuna.  For more advice about eating fish, you can visit the U.S. Food and Drug Administration (FDA) or U.S. Environmental Protection Agency (EPA) website.  Heat lunch meats (such as turkey, ham, or bologna) to 165 F before you eat them. This reduces your risk of getting sick from a kind of bacteria that can be found in lunch meats.  Do not eat unpasteurized soft cheeses, such as brie, feta, fresh mozzarella, and blue cheese. They have a bacteria that could harm your baby.  Limit caffeine to about 200 to 300 mg per day. On average, a cup of brewed coffee has around 80 to 100 mg of caffeine.  Do not drink any alcohol. No amount of alcohol has been found to be safe during pregnancy.  Do not diet or try to lose weight. For example, do not follow a low-carbohydrate diet. If you are overweight at the start of your pregnancy, your doctor will work with you to manage your weight gain.  Tell your doctor about all vitamins and supplements you take.  When should you call for help?  Watch closely for changes in your health, and be sure to contact your doctor if you have any problems.  Where can you learn more?  Go to https://www.[employer].net/patiented  Enter Y785 in the search box to learn more about \"Nutrition During Pregnancy: Care Instructions.\"  Current as of: September 20, 2023               Content Version: 14.0    5363-6924 [employer].   Care instructions adapted under license by your healthcare professional. If you have questions about a medical condition or " this instruction, always ask your healthcare professional. [employer], [employer] disclaims any warranty or liability for your use of this information.      Exercise During Pregnancy: Care Instructions  Overview     Exercise is good for you during a healthy pregnancy. It can relieve back pain, swelling, and other discomforts. It also prepares your muscles for childbirth. And exercise can improve your energy level and help you sleep better.  If your doctor advises it, get more exercise. For example, walking is a good choice. Bit by bit, increase the amount you walk every day. Try for at least 30 minutes on most days of the week. You could also try a prenatal exercise class. But if you do not already exercise, be sure to talk with your doctor before you start a new exercise program. Doctors do not recommend contact sports during pregnancy.  Follow-up care is a key part of your treatment and safety. Be sure to make and go to all appointments, and call your doctor if you are having problems. It's also a good idea to know your test results and keep a list of the medicines you take.  How can you care for yourself at home?  Talk with your doctor about the right kind of exercise for each stage of pregnancy.  Listen to your body to know if your exercise is at a safe level.  Do not become overheated while you exercise. High body temperature can be harmful. Avoid activities that can make your body too hot.  If you feel tired, take it easy. You might walk instead of run.  If you are used to strenuous exercise, ask your doctor how to know when it's time to slow down.  If you exercised before getting pregnant, you should be able to keep up your routine early in your pregnancy. Later in your pregnancy, you may want to switch to more gentle activities.  Drink plenty of fluids before, during, and after exercise.  Avoid contact sports, such as soccer and basketball. Also avoid risky activities. These include scuba diving, horseback  "riding, and exercising at a high altitude (above 6,000 feet). If you live in a place with a high altitude, talk to your doctor about how you can exercise safely.  Do not get overtired while you exercise. You should be able to talk while you work out.  After your fourth month of pregnancy, avoid exercises that require you to lie flat on your back on a hard surface. These include sit-ups and some yoga poses.  Get plenty of rest. You may be very tired while you are pregnant.  Where can you learn more?  Go to https://www.[employer].net/patiented  Enter S801 in the search box to learn more about \"Exercise During Pregnancy: Care Instructions.\"  Current as of: July 10, 2023               Content Version: 14.0    3952-3957 [employer].   Care instructions adapted under license by your healthcare professional. If you have questions about a medical condition or this instruction, always ask your healthcare professional. [employer] disclaims any warranty or liability for your use of this information.      "

## 2024-07-03 NOTE — PROGRESS NOTES
OV   7/3/2024       Assessment/Plan:     Initial labs drawn. UA/UC sent.    Prenatal vitamins.  Problem list reviewed and updated.  First trimester screening/AFP3 discussed: ordered.  Role of ultrasound in pregnancy discussed; fetal survey: discussed.  Amniocentesis discussed: not indicated.  General educational information given, including benefits of breast-feeding. See AVS for details.   Follow up in 4 weeks.      Subjective:            Estela Ramirez is being seen today for her first obstetrical visit.  This is not a planned pregnancy. She is at 11 gestation. Her obstetrical history is significant for n/a. Relationship with FOB: . Patient does intend to breast feed. Pregnancy history fully reviewed.        Current symptoms also include: fatigue and morning sickness.  Sexual Activity: single partner, contraception - none.         Obstetrical history is significant for:   Patient's last menstrual period was 04/17/2024 (exact date).. Last period was normal, regular.    Her nausea is improving, does have some pain in the rlq at times but this is getting better.     High risk factors (1+):NONE  Moderate risk factors (2+): age 35+ yo       Infection History   - Live with someone with TB or exposed to TB No  - Patient reported with history of genital herpes No  - Rash or viral illness since LMP   No  - Prior GBS infected child    No  - Hepatitis B or C     No  - Gonorrhea      No  - Chlamydia      No  - HPV       No  - HIV       No  - Syphilis      No  - Other STI's      No  - Other (see comments)    No        Genetic Screening/Teratology Counseling  - Patient's age 35 years or older as of estimated date of delivery  Yes  - Thalassemia    (Italian, Greek, Mediterranean, or  background) No  - MCV less than 80       No   - Neural tube defects    (meningomyelocele, spina bifida, or anencephaly)  No  - Congenital heart defect      No  - Down syndrome       No  - Hugh-Sachs (Ashkenazi Gnosticist, Cajun, Chinese  Niuean)   No  - Canavan Disease (Ashkenazi Yarsani)    No  - Familial Dysautonomia (Ashkenazi Yarsani)    No  - Sickle cell disease or trait ()     No  - Hemophilia or other blood disorders    No  - Muscular dystrophy       No  - Cystic fibrosis       No  - Sobeida's chorea       No  - Mental retardation/autism       No   (If yes, was the patient tested for fragile X?)     - Other inherited genetic or chromosomal disorders   No  - Maternal metabolic disorders (type 1 diabetes, PKU)  No  - Patient or baby's father had a child with birth defects   No  - Recurrent pregnancy loss, or stillbirth    No  - Medications   (including supplements, vitamins, herbs, OTC drugs)   /illicit/recreational drugs/alcohol since last LMP    list agents and strength, dosing    Yes: PNV, Montelukast, Wixela, cetirizine, apap  Any other (see comments)      No      Discussed in today's office visit  HIV and other routine prenatal tests     Yes  Risk Factors Identified by Prenatal history   Yes  Anticipated course of prenatal care    Yes  Nutrition and weight gain counseling: special diet   Yes  Toxoplasmosis precautions (Cats/Raw meat)  Yes  Sexual activity       Yes  Exercise       Yes  Influenza vaccine      Yes  Smoking counseling      Yes  Environmental/work hazards     Yes  Travel        Yes  Tobacco (asked, advise, assess, assist and arrange) Yes  Alcohol       Yes  Illicit/recreational drugs     Yes  Use of any meds   (including supplements, vitamins, herbs, OTC drugs) Yes  Indications for ultrasound     Yes  Domestic violence      Yes  Seat Belt Use        Yes  Childbirth classes/hospital facilities    Yes  Dental care       Yes  Avoidance of saunas or hot tubs    Yes  Teratogens        Yes  Breast-feeding       Yes  Screening for aneuploidy     Yes      Review of Systems  Pertinent items are noted in HPI.          Objective:     /66 (BP Location: Left arm, Patient Position: Sitting, Cuff Size: Adult Regular)   Pulse  "76   Temp 98.1  F (36.7  C) (Oral)   Resp 16   Ht 1.702 m (5' 7\")   Wt 76 kg (167 lb 8 oz)   LMP 04/17/2024 (Exact Date)   SpO2 99%   Breastfeeding No   BMI 26.23 kg/m    General: alert, pleasant, and no distress  Head: Normocephalic, without obvious abnormality, atraumatic  Eyes: conjunctivae and sclerae normal and pupils equal, round, reactive to   light and accomodation  Ears: normal TM's and external ear canals both ears  Nose: no discharge, no sinus tenderness  Throat: lips, mucosa, and tongue normal; teeth and gums normal  Neck: no adenopathy, supple, symmetrical, trachea midline and thyroid normal  Back: symmetric, ROM normal. No CVA tenderness.  Lungs: clear to auscultation bilaterally  Breasts: normal appearance, no masses or tenderness  Heart:: regular rate and rhythm and no murmurs  Abdomen:  bowel sounds normal, no masses palpable and soft, non-tender  Pelvic: Deferred  Extremities: extremities normal, atraumatic, no cyanosis or edema  Pulses: 2+ and symmetric  Skin: Skin color, texture, turgor normal. No rashes or lesions  Lymph nodes: Cervical, supraclavicular, and axillary nodes normal.  Neurologic: Grossly normal          "

## 2024-07-04 LAB
C TRACH DNA SPEC QL NAA+PROBE: NEGATIVE
HBV SURFACE AG SERPL QL IA: NONREACTIVE
HCV AB SERPL QL IA: NONREACTIVE
HIV 1+2 AB+HIV1 P24 AG SERPL QL IA: NONREACTIVE
N GONORRHOEA DNA SPEC QL NAA+PROBE: NEGATIVE
VIT D+METAB SERPL-MCNC: 42 NG/ML (ref 20–50)

## 2024-07-05 LAB — BACTERIA UR CULT: NORMAL

## 2024-07-11 LAB — SCANNED LAB RESULT: NORMAL

## 2024-08-02 ENCOUNTER — PRENATAL OFFICE VISIT (OUTPATIENT)
Dept: FAMILY MEDICINE | Facility: CLINIC | Age: 39
End: 2024-08-02
Payer: COMMERCIAL

## 2024-08-02 VITALS
DIASTOLIC BLOOD PRESSURE: 62 MMHG | BODY MASS INDEX: 27.1 KG/M2 | HEART RATE: 76 BPM | TEMPERATURE: 98.1 F | SYSTOLIC BLOOD PRESSURE: 112 MMHG | RESPIRATION RATE: 17 BRPM | OXYGEN SATURATION: 98 % | WEIGHT: 173 LBS

## 2024-08-02 DIAGNOSIS — O09.529 SUPERVISION OF HIGH-RISK PREGNANCY OF ELDERLY MULTIGRAVIDA: Primary | ICD-10-CM

## 2024-08-02 DIAGNOSIS — L20.82 FLEXURAL ECZEMA: ICD-10-CM

## 2024-08-02 PROCEDURE — 99207 PR PRENATAL VISIT: CPT | Performed by: FAMILY MEDICINE

## 2024-08-02 RX ORDER — TRIAMCINOLONE ACETONIDE 1 MG/G
CREAM TOPICAL 2 TIMES DAILY PRN
Qty: 30 G | Refills: 2 | Status: SHIPPED | OUTPATIENT
Start: 2024-08-02

## 2024-08-02 NOTE — PATIENT INSTRUCTIONS
"Weeks 14 to 18 of Your Pregnancy: Care Instructions  Around this time, you may start to look pregnant. Your baby is now able to pass urine. And the first stool (meconium) is starting to collect in your baby's intestines. Hair is starting to grow on your baby's head.    You may notice some skin changes, such as itchy spots on your palms or acne on your face.    At your next doctor visit, you may have an ultrasound. So you might think about whether you want to know the sex of your baby. Also ask your doctor about flu and COVID-19 shots.     How to reduce stress   Ask for help when you need it.  Try to avoid things that cause you stress.  Seek out things that relieve stress, such as breathing exercises or yoga.     How to get exercise   If you don't usually exercise, start slowly. Short walks may be a good choice.  Try to be active 30 minutes a day, at least 5 days a week.  Avoid activities where you're more likely to fall.  Use light weights to reduce stress on your joints.     How to stay at a healthy weight for you   Talk to your doctor or midwife about how much weight you should gain.  It's generally best to gain:  About 28 to 40 pounds if you're underweight.  About 25 to 35 pounds if you're at a healthy weight.  About 15 to 25 pounds if you're overweight.  About 11 to 20 pounds if you're very overweight (obese).  Follow-up care is a key part of your treatment and safety. Be sure to make and go to all appointments, and call your doctor if you are having problems. It's also a good idea to know your test results and keep a list of the medicines you take.  Where can you learn more?  Go to https://www.[employer].net/patiented  Enter I453 in the search box to learn more about \"Weeks 14 to 18 of Your Pregnancy: Care Instructions.\"  Current as of: July 10, 2023               Content Version: 14.0    8276-4284 Healthwise, Inc[employer].   Care instructions adapted under license by your healthcare professional. If you have " questions about a medical condition or this instruction, always ask your healthcare professional. Healthwise, [facility] disclaims any warranty or liability for your use of this information.      Second-Trimester Fetal Ultrasound: About This Test  What is it?     Fetal ultrasound is a test that uses sound waves to make pictures of your baby (fetus) and placenta inside the uterus. The test is the safest way to find out the age, size, and position of your baby. You also may be able to find out the sex of your baby. (But the test isn't done just to find out a baby's sex.)  No known risks to the mother or the baby are linked to fetal ultrasound. But you may feel anxious if the test reveals a problem with your pregnancy or baby.  Why is this test done?  In the second trimester, a fetal ultrasound is done to:  Estimate the number of weeks and days a fetus has developed since the beginning of the pregnancy. This is called the gestational age.  Look at the size and position of the fetus, the placenta, and the fluid that surrounds the fetus.  Find major birth defects, such as heart problems or problems with the brain and spinal cord (neural tube defects). But the test may not be able to find many minor defects and some major birth defects.  How do you prepare for the test?  In general, there's nothing you have to do before this test, unless your doctor tells you to.  How is the test done?  You may be able to leave your clothes on, or you will be given a gown to wear.  You will lie on your back on a padded examination table.  A gel will be spread on your belly. It will be removed after the test.  A small, handheld device called a transducer will be pressed against the gel on your skin and moved across your belly several times.  You may watch the monitor to see the picture of your baby during the test.  What happens after the test?  You will probably be able to go home right away.  You most likely will be able to go back to  "your usual activities right away.  Follow-up care is a key part of your treatment and safety. Be sure to make and go to all appointments, and call your doctor if you are having problems. It's also a good idea to keep a list of the medicines you take. Ask your doctor when you can expect to have your test results.  Where can you learn more?  Go to https://[email or web address]/patiented  Enter Y671 in the search box to learn more about \"Second-Trimester Fetal Ultrasound: About This Test.\"  Current as of: July 10, 2023  Content Version: 14.1    5067-3167 [employer].   Care instructions adapted under license by your healthcare professional. If you have questions about a medical condition or this instruction, always ask your healthcare professional. [employer] disclaims any warranty or liability for your use of this information.    During Pregnancy: Exercises  Introduction  Here are some examples of exercises to do during your pregnancy. Start each exercise slowly. Ease off the exercise if you start to have pain.  Talk to your doctor about when you can start these exercises and which ones will work best for you.  How to do the exercises  Neck rotation    Sit up straight in a firm chair, or stand up straight. If you're standing, keep your feet about hip-width apart.  Keeping your chin level, turn your head to the right and hold for 15 to 30 seconds.  Turn your head to the left and hold for 15 to 30 seconds.  Repeat 2 to 4 times.  Neck stretch to the front    Sit up straight in a firm chair, or stand up straight. Look straight ahead. If you're standing, keep your feet about hip-width apart.  Slowly bend your head forward without moving your shoulders.  Hold for 15 to 30 seconds, then return to your starting position.  Repeat 2 to 4 times.  Back press    Stand with your back 10 to 12 inches away from a wall.  Lean into the wall until your back is against it. Press your lower back against the wall by " pulling in your stomach muscles.  Slowly slide down until your knees are slightly bent, pressing your lower back against the wall.  Hold for at least 6 seconds, then slide back up the wall.  Repeat 8 to 12 times.  Over time, work up to holding this position for as much as 1 minute.  Trunk twist    Sit on the floor with your legs crossed. If that's not comfortable, you can sit on a folded blanket so your bottom is a few inches off the floor. Or you can sit on a chair with your knees hip-width apart and your feet flat on the floor.  Reach your left hand toward your right knee. You can place your right hand at your side for support.  Slowly twist your body (trunk) to your right.  Relax and return to your starting position.  Repeat 2 to 4 times.  Switch your hands and twist to your left.  Repeat 2 to 4 times.  Pelvic rocking on hands and knees    Start on your hands and knees. Place your wrists directly below your shoulders and your knees below your hips.  Breathe in slowly. Tuck your head downward and round your back up, making a curve with your back in the shape of the letter C. Hold this position for about 6 seconds.  Breathe out slowly and bring your head back up. Relax, keeping your back straight. (Don't allow it to curve toward the floor.) Hold for about 6 seconds.  Repeat 8 to 12 times, gently rocking your pelvis.  Pelvic tilt    Lie on your back with your knees bent and your feet flat on the floor.  Tighten your belly muscles by pulling your belly button in toward your spine. Press your lower back to the floor. You should feel your hips and pelvis rock back.  Hold for 6 seconds while breathing smoothly, and then relax.  Repeat 8 to 12 times.  Do this exercise only during the first 4 months of pregnancy. After this point, lying on your back is not recommended, because it can cause blood flow problems for you and your baby.  Backward stretch    Start on your hands and knees with your knees 8 to 10 inches apart,  hands directly below your shoulders, and arms and back straight.  Keeping your arms straight, slowly lower your buttocks toward your heels and tuck your head toward your knees. Hold for 15 to 30 seconds.  Slowly return to the starting position.  Repeat 2 to 4 times.  Forward bend    Sit comfortably in a chair, with your arms relaxed.  Slowly bend forward, allowing your arms to hang down. Lean only as far as you can without feeling discomfort or pressure on your belly.  Hold for 15 to 30 seconds and then slowly sit up straight.  Repeat 2 to 4 times or to your comfort level.  Donkey kick    Start on your hands and knees. Place your hands directly below your shoulders, and keep your arms straight.  Tighten your belly muscles by pulling your belly button in toward your spine. Keep breathing normally, and don't hold your breath.  Lift one knee and bring it toward your elbow.  Slowly extend that leg behind you without completely straightening it. Be careful not to let your hip drop down. Avoid arching your back.  Hold your leg behind you for about 6 seconds.  Return to your starting position.  Repeat 8 to 12 times for each leg.  Tailor sitting    Sit on the floor.  Bring your feet close to your body while crossing your ankles.  Keep your back straight. Relax your legs and let your knees drop toward the floor.  Hold this position for as long as you are comfortable.  Toe reach    Sit on the floor with your back straight, legs about 12 inches apart, and feet relaxed outward.  Stretch your hands forward toward your right foot, then sit up.  Stretch your hands straight forward, then sit up.  Stretch your hands forward toward your left foot, then sit up.  Hold each stretch for 15 to 30 seconds.  Repeat 2 to 4 times.  Follow-up care is a key part of your treatment and safety. Be sure to make and go to all appointments, and call your doctor if you are having problems. It's also a good idea to know your test results and keep a  list of the medicines you take.  Current as of: July 10, 2023               Content Version: 14.0    0888-4028 [employer].   Care instructions adapted under license by your healthcare professional. If you have questions about a medical condition or this instruction, always ask your healthcare professional. [employer] disclaims any warranty or liability for your use of this information.

## 2024-08-02 NOTE — PROGRESS NOTES
Doing well.  No concerns today.  Prenatal flowsheet information is reviewed.  Discussed triple/quad screening.  She is uncertain about testing at this time.  Reportable signs and symptoms discussed.    She does have a rash on her right antecubital fossa as well as on her chest. She does have worse on the right than the left. Her chest is quite itchy as well. Ok to reach out for dermatology if not improving with triamcinolone.     She has had some mild headaches, no blurry vision, ruq pain swelling. Not convinced that she is feeling her baby move.

## 2024-08-30 ENCOUNTER — PRENATAL OFFICE VISIT (OUTPATIENT)
Dept: FAMILY MEDICINE | Facility: CLINIC | Age: 39
End: 2024-08-30
Payer: COMMERCIAL

## 2024-08-30 VITALS
DIASTOLIC BLOOD PRESSURE: 66 MMHG | RESPIRATION RATE: 16 BRPM | OXYGEN SATURATION: 98 % | HEART RATE: 95 BPM | SYSTOLIC BLOOD PRESSURE: 110 MMHG | WEIGHT: 175 LBS | TEMPERATURE: 97.6 F | BODY MASS INDEX: 27.47 KG/M2 | HEIGHT: 67 IN

## 2024-08-30 DIAGNOSIS — O09.529 SUPERVISION OF HIGH-RISK PREGNANCY OF ELDERLY MULTIGRAVIDA: Primary | ICD-10-CM

## 2024-08-30 PROCEDURE — 99207 PR PRENATAL VISIT: CPT | Performed by: FAMILY MEDICINE

## 2024-08-30 NOTE — PROGRESS NOTES
Doing well.  No concerns today.  Prenatal flowsheet information is reviewed.  Reportable signs and symptoms discussed.    She is doing well. She has had a lot of loose stools. She does think that this has been the last few weeks.     The cream is helping, she does still itch at times. She is still taking her allergy medications. Her headaches are a little better. She does feel that her baby is low.     Us ordered. Follow up in four weeks.

## 2024-08-30 NOTE — PATIENT INSTRUCTIONS
"What you have left for appointments:     24  28  30  32  34  36-41 weekly    Weeks 18 to 22 of Your Pregnancy: Care Instructions  At this stage you may find that your nausea and fatigue are gone. You may feel better overall and have more energy. But you might now also have some new discomforts, like sleep problems or leg cramps.    You may start to feel your baby move. These movements can feel like butterflies or bubbles.    Babies at this stage can now suck their thumbs.    Get some exercise every day.  And avoid caffeine late in the day.     Take a warm shower or bath before bed.  Try relaxation exercises to calm your mind and body.     Use extra pillows.  They can help you get comfortable.     Don't use sleeping pills or alcohol.  They could harm your baby.     For leg cramps, stretch and apply heat.  A warm bath, leg warmers, a heating pad, or a hot water bottle can help with muscle aches.   Stretches for leg cramps    Straighten your leg and bend your foot (flex your ankle) slowly upward, toward your knee. Bend your toes up and down.    Stand on a flat surface. Stretch your toes upward. For balance, hold on to the wall or something stable. If it feels okay, take small steps walking on your heels.  Follow-up care is a key part of your treatment and safety. Be sure to make and go to all appointments, and call your doctor if you are having problems. It's also a good idea to know your test results and keep a list of the medicines you take.  Where can you learn more?  Go to https://www.[employer].net/patiented  Enter W603 in the search box to learn more about \"Weeks 18 to 22 of Your Pregnancy: Care Instructions.\"  Current as of: July 10, 2023               Content Version: 14.0    5837-0847 Healthwise, Incorporated.   Care instructions adapted under license by your healthcare professional. If you have questions about a medical condition or this instruction, always ask your healthcare professional. [employer] [facility], " Incorporated disclaims any warranty or liability for your use of this information.      Weeks 22 to 26 of Your Pregnancy: Care Instructions  Your baby's lungs are getting ready for breathing. Your baby may respond to your voice. Your baby likely turns less, and kicks or jerks more. Jerking may mean that your baby has hiccups.    Think about taking childbirth classes. And start to think about whether you want to have pain medicine during labor.    At your next doctor visit, you may be tested for anemia and for high blood sugar that first occurs during pregnancy (gestational diabetes). These conditions can cause problems for you and your baby.    To ease discomfort, such as back pain    Change your position often. Try not to sit or stand for too long.  Get some exercise. Things like walking or stretching may help.  Try using a heating pad or cold pack.    To ease or reduce swelling in your feet, ankles, hands, and fingers    Take off your rings.  Avoid high-sodium foods, such as potato chips.  Prop up your feet, and sleep with pillows under your feet.  Try to avoid standing for long periods of time.  Do not wear tight shoes.  Wear support stockings.  Kegel exercises to prevent urine from leaking    Squeeze your muscles as if you were trying not to pass gas. Your belly, legs, and buttocks shouldn't move. Hold the squeeze for 3 seconds, then relax for 5 to 10 seconds.    Add 1 second each week until you can squeeze for 10 seconds. Repeat the exercise 10 times a session. Do 3 to 8 sessions a day. If these exercises cause you pain, stop doing them and talk with your doctor.  Follow-up care is a key part of your treatment and safety. Be sure to make and go to all appointments, and call your doctor if you are having problems. It's also a good idea to know your test results and keep a list of the medicines you take.  Where can you learn more?  Go to https://www.healthwise.net/patiented  Enter G264 in the search box to learn  "more about \"Weeks 22 to 26 of Your Pregnancy: Care Instructions.\"  Current as of: July 10, 2023               Content Version: 14.0    6296-7490 [employer].   Care instructions adapted under license by your healthcare professional. If you have questions about a medical condition or this instruction, always ask your healthcare professional. [employer] disclaims any warranty or liability for your use of this information.      "

## 2024-09-12 ENCOUNTER — HOSPITAL ENCOUNTER (OUTPATIENT)
Dept: ULTRASOUND IMAGING | Facility: CLINIC | Age: 39
Discharge: HOME | End: 2024-09-12
Attending: FAMILY MEDICINE | Admitting: FAMILY MEDICINE
Payer: COMMERCIAL

## 2024-09-12 DIAGNOSIS — O09.529 SUPERVISION OF HIGH-RISK PREGNANCY OF ELDERLY MULTIGRAVIDA: ICD-10-CM

## 2024-09-12 PROCEDURE — 76805 OB US >/= 14 WKS SNGL FETUS: CPT

## 2024-10-08 ENCOUNTER — OFFICE VISIT (OUTPATIENT)
Dept: FAMILY MEDICINE | Facility: CLINIC | Age: 39
End: 2024-10-08
Payer: COMMERCIAL

## 2024-10-08 VITALS
HEIGHT: 67 IN | HEART RATE: 88 BPM | WEIGHT: 184.9 LBS | SYSTOLIC BLOOD PRESSURE: 110 MMHG | TEMPERATURE: 98.7 F | OXYGEN SATURATION: 98 % | RESPIRATION RATE: 18 BRPM | DIASTOLIC BLOOD PRESSURE: 70 MMHG | BODY MASS INDEX: 29.02 KG/M2

## 2024-10-08 DIAGNOSIS — J30.2 SEASONAL ALLERGIC RHINITIS, UNSPECIFIED TRIGGER: ICD-10-CM

## 2024-10-08 DIAGNOSIS — I83.813 VARICOSE VEINS OF BOTH LOWER EXTREMITIES WITH PAIN: Primary | ICD-10-CM

## 2024-10-08 DIAGNOSIS — Z86.79 HISTORY OF SUPERFICIAL PHLEBITIS: ICD-10-CM

## 2024-10-08 DIAGNOSIS — B37.0 THRUSH: ICD-10-CM

## 2024-10-08 PROCEDURE — 99207 PR PRENATAL VISIT: CPT | Performed by: FAMILY MEDICINE

## 2024-10-08 RX ORDER — NYSTATIN 100000 [USP'U]/ML
500000 SUSPENSION ORAL 4 TIMES DAILY
Qty: 200 ML | Refills: 3 | Status: SHIPPED | OUTPATIENT
Start: 2024-10-08 | End: 2024-10-18

## 2024-10-08 RX ORDER — FLUTICASONE PROPIONATE 50 MCG
2 SPRAY, SUSPENSION (ML) NASAL DAILY
Qty: 16 G | Refills: 5 | Status: SHIPPED | OUTPATIENT
Start: 2024-10-08

## 2024-10-08 ASSESSMENT — PAIN SCALES - GENERAL: PAINLEVEL: NO PAIN (0)

## 2024-10-08 NOTE — PROGRESS NOTES
Doing well.  No concerns today.  Prenatal flowsheet information is reviewed.  Reportable signs and symptoms discussed.    She is doing ok for the most part. She does think that she is getting a cold. She does note that she has changed her Wixela, and now does think that she has a generic wixela as well.     She is still itchy at times, will move and be somewhere else.     She does have some swelling in her feet at times, does not note thatshe has had any headaches, blurry vision, ruq pain.     Prescription for some new compression socks provided for her.  Flonase and nystatin filled for her for as needed usage as well.  Lastly she will continue using her inhalers as prescribed for now, if not improving she will let me know and we will consider prednisone if needed.  Additionally she will stop using the Aveeno as it does have oats in it which she is allergic to to see if this stops her itching.

## 2024-10-29 ENCOUNTER — MYC MEDICAL ADVICE (OUTPATIENT)
Dept: FAMILY MEDICINE | Facility: CLINIC | Age: 39
End: 2024-10-29
Payer: COMMERCIAL

## 2024-10-29 NOTE — TELEPHONE ENCOUNTER
Per dr Robertson- no fasting required. Pt should be mindful of what she eats. She should avoid a lot of sweet or foods with excess sugar.

## 2024-11-01 ENCOUNTER — PRENATAL OFFICE VISIT (OUTPATIENT)
Dept: FAMILY MEDICINE | Facility: CLINIC | Age: 39
End: 2024-11-01
Payer: COMMERCIAL

## 2024-11-01 ENCOUNTER — HOSPITAL ENCOUNTER (OUTPATIENT)
Dept: ULTRASOUND IMAGING | Facility: CLINIC | Age: 39
Discharge: HOME | End: 2024-11-01
Attending: FAMILY MEDICINE | Admitting: FAMILY MEDICINE
Payer: COMMERCIAL

## 2024-11-01 VITALS
RESPIRATION RATE: 17 BRPM | OXYGEN SATURATION: 98 % | HEIGHT: 67 IN | WEIGHT: 187 LBS | TEMPERATURE: 98.1 F | SYSTOLIC BLOOD PRESSURE: 112 MMHG | HEART RATE: 78 BPM | BODY MASS INDEX: 29.35 KG/M2 | DIASTOLIC BLOOD PRESSURE: 68 MMHG

## 2024-11-01 DIAGNOSIS — O09.529 SUPERVISION OF HIGH-RISK PREGNANCY OF ELDERLY MULTIGRAVIDA: Primary | ICD-10-CM

## 2024-11-01 DIAGNOSIS — R60.0 EDEMA OF LOWER EXTREMITY: ICD-10-CM

## 2024-11-01 DIAGNOSIS — R05.2 SUBACUTE COUGH: ICD-10-CM

## 2024-11-01 LAB
GLUCOSE 1H P 50 G GLC PO SERPL-MCNC: 109 MG/DL (ref 70–129)
HGB BLD-MCNC: 11.2 G/DL (ref 11.7–15.7)
T PALLIDUM AB SER QL: NONREACTIVE

## 2024-11-01 PROCEDURE — 86780 TREPONEMA PALLIDUM: CPT | Performed by: FAMILY MEDICINE

## 2024-11-01 PROCEDURE — 93971 EXTREMITY STUDY: CPT | Mod: RT

## 2024-11-01 PROCEDURE — 99207 PR PRENATAL VISIT: CPT | Performed by: FAMILY MEDICINE

## 2024-11-01 PROCEDURE — 82950 GLUCOSE TEST: CPT | Performed by: FAMILY MEDICINE

## 2024-11-01 PROCEDURE — 36415 COLL VENOUS BLD VENIPUNCTURE: CPT | Performed by: FAMILY MEDICINE

## 2024-11-01 RX ORDER — AZITHROMYCIN 250 MG/1
TABLET, FILM COATED ORAL
Qty: 6 TABLET | Refills: 0 | Status: SHIPPED | OUTPATIENT
Start: 2024-11-01 | End: 2024-11-06

## 2024-11-01 NOTE — PROGRESS NOTES
Doing well.  No concerns today.  1 hour GTT done today.  Prenatal flowsheet information is reviewed.  Reportable signs and symptoms discussed.    She is doing well for the most part.     She does note that has a cough and does feel that her congestion is trying to come out. She has been coughing since her last visit. Worse at night. She is using her albuterol and wixela as well as singulair.     She has been wearing thigh high compression socks and does note that she still has had some swelling, right worse than left. There was some purple discoloration as well. No pain.     She has had no h/a, blurry vision, ruq pain. Baby is moving well.     Will treat the cough with azithromycin, no wheezing today on exam. Tdap next time, 28 week labs done today.   Given asymmetric swelling will refer for DVT us as well.

## 2024-11-12 ENCOUNTER — PRENATAL OFFICE VISIT (OUTPATIENT)
Dept: FAMILY MEDICINE | Facility: CLINIC | Age: 39
End: 2024-11-12
Payer: COMMERCIAL

## 2024-11-12 VITALS
WEIGHT: 189 LBS | RESPIRATION RATE: 17 BRPM | SYSTOLIC BLOOD PRESSURE: 116 MMHG | BODY MASS INDEX: 29.66 KG/M2 | HEIGHT: 67 IN | TEMPERATURE: 97.7 F | HEART RATE: 88 BPM | OXYGEN SATURATION: 99 % | DIASTOLIC BLOOD PRESSURE: 58 MMHG

## 2024-11-12 DIAGNOSIS — Z23 IMMUNIZATION DUE: Primary | ICD-10-CM

## 2024-11-12 PROCEDURE — 90471 IMMUNIZATION ADMIN: CPT | Performed by: FAMILY MEDICINE

## 2024-11-12 PROCEDURE — 90715 TDAP VACCINE 7 YRS/> IM: CPT | Performed by: FAMILY MEDICINE

## 2024-11-12 PROCEDURE — 99207 PR PRENATAL VISIT: CPT | Performed by: FAMILY MEDICINE

## 2024-11-12 ASSESSMENT — ASTHMA QUESTIONNAIRES
QUESTION_2 LAST FOUR WEEKS HOW OFTEN HAVE YOU HAD SHORTNESS OF BREATH: THREE TO SIX TIMES A WEEK
QUESTION_1 LAST FOUR WEEKS HOW MUCH OF THE TIME DID YOUR ASTHMA KEEP YOU FROM GETTING AS MUCH DONE AT WORK, SCHOOL OR AT HOME: NONE OF THE TIME
ACT_TOTALSCORE: 19
QUESTION_5 LAST FOUR WEEKS HOW WOULD YOU RATE YOUR ASTHMA CONTROL: WELL CONTROLLED
ACT_TOTALSCORE: 19
QUESTION_4 LAST FOUR WEEKS HOW OFTEN HAVE YOU USED YOUR RESCUE INHALER OR NEBULIZER MEDICATION (SUCH AS ALBUTEROL): ONE OR TWO TIMES PER DAY
QUESTION_3 LAST FOUR WEEKS HOW OFTEN DID YOUR ASTHMA SYMPTOMS (WHEEZING, COUGHING, SHORTNESS OF BREATH, CHEST TIGHTNESS OR PAIN) WAKE YOU UP AT NIGHT OR EARLIER THAN USUAL IN THE MORNING: NOT AT ALL

## 2024-11-12 NOTE — PROGRESS NOTES
Prenatal flowsheet information is reviewed.  Doing well.  No concerns today.    She is doing ok. She is still swollen a little. She does still have some itching. Denies any issues with headaches, blurry vision, ruq pain, swelling.     Doing well at this time. Will follow up in 2 weeks.

## 2024-11-12 NOTE — PATIENT INSTRUCTIONS
"Weeks 30 to 32 of Your Pregnancy: Care Instructions  Your baby is growing more every day. Its eyes can open and close, and it may have hair on its head. Your baby may sleep 20 to 45 minutes at a time and is more active at certain times.    You should feel your baby move several times every day. Your baby now turns less and kicks more.   This is a good time to tour your hospital or birthing center. You may also want to find childcare if needed.         To ease heartburn   Avoid foods that make your symptoms worse, such as chocolate, spicy foods, and caffeine.  Avoid bending over or lying down after meals.  Do not eat for 2 hours before bedtime.  Take antacids like Tums, but don't take ones that have sodium bicarbonate, magnesium trisilicate, or aspirin.        To care for large, swollen veins (varicose veins)   Try to avoid standing for long periods of time.  Sit with your feet propped up.  Wear support hose.  Get some exercise every day, like walking or swimming.  Counting your baby's kicks  Your doctor may ask you to count your baby's movements, such as kicks, flutters, or rolls.    Find a quiet place, and get comfortable. Write down your start time. Count your baby's movements (except hiccups). When your baby has moved 10 times, you can stop counting. Write down how many minutes it took.   If an hour goes by and you don't feel 10 movements, have something to eat or drink. Count for another hour. If you don't feel at least 10 movements in the 2-hour period, call your doctor.   Follow-up care is a key part of your treatment and safety. Be sure to make and go to all appointments, and call your doctor if you are having problems. It's also a good idea to know your test results and keep a list of the medicines you take.  Where can you learn more?  Go to https://www.Infermedicawise.net/patiented  Enter X471 in the search box to learn more about \"Weeks 30 to 32 of Your Pregnancy: Care Instructions.\"  Current as of: July 10, " 2023  Content Version: 14.2 2024 [facility] [place].   Care instructions adapted under license by your healthcare professional. If you have questions about a medical condition or this instruction, always ask your healthcare professional. Healthwise, Inc[employer] disclaims any warranty or liability for your use of this information.

## 2024-12-10 ASSESSMENT — ASTHMA QUESTIONNAIRES
QUESTION_1 LAST FOUR WEEKS HOW MUCH OF THE TIME DID YOUR ASTHMA KEEP YOU FROM GETTING AS MUCH DONE AT WORK, SCHOOL OR AT HOME: NONE OF THE TIME
QUESTION_5 LAST FOUR WEEKS HOW WOULD YOU RATE YOUR ASTHMA CONTROL: WELL CONTROLLED
QUESTION_2 LAST FOUR WEEKS HOW OFTEN HAVE YOU HAD SHORTNESS OF BREATH: ONCE OR TWICE A WEEK
ACT_TOTALSCORE: 20
QUESTION_4 LAST FOUR WEEKS HOW OFTEN HAVE YOU USED YOUR RESCUE INHALER OR NEBULIZER MEDICATION (SUCH AS ALBUTEROL): ONE OR TWO TIMES PER DAY
ACT_TOTALSCORE: 20
QUESTION_3 LAST FOUR WEEKS HOW OFTEN DID YOUR ASTHMA SYMPTOMS (WHEEZING, COUGHING, SHORTNESS OF BREATH, CHEST TIGHTNESS OR PAIN) WAKE YOU UP AT NIGHT OR EARLIER THAN USUAL IN THE MORNING: NOT AT ALL

## 2024-12-11 ENCOUNTER — PRENATAL OFFICE VISIT (OUTPATIENT)
Dept: FAMILY MEDICINE | Facility: CLINIC | Age: 39
End: 2024-12-11
Payer: COMMERCIAL

## 2024-12-11 VITALS
HEART RATE: 79 BPM | WEIGHT: 193 LBS | RESPIRATION RATE: 16 BRPM | BODY MASS INDEX: 30.23 KG/M2 | DIASTOLIC BLOOD PRESSURE: 62 MMHG | OXYGEN SATURATION: 100 % | SYSTOLIC BLOOD PRESSURE: 111 MMHG

## 2024-12-11 DIAGNOSIS — Z23 IMMUNIZATION DUE: ICD-10-CM

## 2024-12-11 DIAGNOSIS — O09.529 SUPERVISION OF HIGH-RISK PREGNANCY OF ELDERLY MULTIGRAVIDA: Primary | ICD-10-CM

## 2024-12-11 PROCEDURE — 90678 RSV VACC PREF BIVALENT IM: CPT | Performed by: FAMILY MEDICINE

## 2024-12-11 PROCEDURE — 99207 PR PRENATAL VISIT: CPT | Performed by: FAMILY MEDICINE

## 2024-12-11 PROCEDURE — 90471 IMMUNIZATION ADMIN: CPT | Performed by: FAMILY MEDICINE

## 2024-12-11 ASSESSMENT — PAIN SCALES - GENERAL: PAINLEVEL_OUTOF10: NO PAIN (0)

## 2024-12-11 NOTE — PATIENT INSTRUCTIONS
Weeks 34 to 36 of Your Pregnancy: Care Instructions  Your belly has grown quite large. It's almost time to give birth! Your baby's lungs are almost ready to breathe air. The skull bones are firm enough to protect your baby's head. But they're soft enough to move down through the birth canal.    You might be wondering what to expect during labor. Because each birth is different, there's no way to know exactly what childbirth will be like for you. Talk to your doctor or midwife about any concerns you have.   You'll probably have a test for group B streptococcus (GBS). GBS is bacteria that can live in the vagina and rectum. GBS can make your baby sick after birth. If you test positive, you'll get antibiotics during labor.     Choose what type of pain relief you would like during labor.  You can choose from a few types, including medicine and non-medicine options. You may want to use several types of pain relief.     Know how medicines can help with pain during labor.  Some medicines lower anxiety and help with some of the pain. Others make your lower body numb so that you will feel less pain.     Tell your doctor about your pain medicine choice.  Do this before you start labor or very early in your labor. You may be able to change your mind during labor.     Learn about the stages of labor.    The first stage includes the early (latent) and active phases of labor. Contractions start in early labor. During active labor, contractions get stronger, last longer, and happen more often. And the cervix opens more rapidly.  The second stage starts when you're ready to push. During this stage, your baby is born.  During the third stage, you'll usually have a few more contractions to push out the placenta.   Follow-up care is a key part of your treatment and safety. Be sure to make and go to all appointments, and call your doctor if you are having problems. It's also a good idea to know your test results and keep a list of the  "medicines you take.  Where can you learn more?  Go to https://www.Calcivis.net/patiented  Enter B912 in the search box to learn more about \"Weeks 34 to 36 of Your Pregnancy: Care Instructions.\"  Current as of: July 10, 2023  Content Version: 2024 [facility] [employer].   Care instructions adapted under license by your healthcare professional. If you have questions about a medical condition or this instruction, always ask your healthcare professional. Healthwise, Incorporated disclaims any warranty or liability for your use of this information.    Group B Strep During Pregnancy: Care Instructions  Overview     Group B strep infection is caused by a type of bacteria. It's a different kind of bacteria than the kind that causes strep throat.  You may have this kind of bacteria in your body. Sometimes it may cause an infection, but most of the time it doesn't make you sick or cause symptoms. But if you pass the bacteria to your baby during the birth, it can cause serious health problems for your baby.  If you have this bacteria in your body, you will get antibiotics when you are in labor. Antibiotics help prevent problems for a  baby.  After birth, doctors will watch and may test your baby. If your baby tests positive for Group B strep, your baby will get antibiotics.  If you plan to breastfeed your baby, don't worry. It will be safe to breastfeed.  Follow-up care is a key part of your treatment and safety. Be sure to make and go to all appointments, and call your doctor if you are having problems. It's also a good idea to know your test results and keep a list of the medicines you take.  How can you care for yourself at home?  If your doctor has prescribed antibiotics, take them as directed. Do not stop taking them just because you feel better. You need to take the full course of antibiotics.  Tell your doctor if you are allergic to any antibiotic.  If you go into labor, or your water breaks, go to the " "hospital. Your doctor will give you antibiotics to help protect your baby from infection.  Tell the doctors and nurses if you have an allergy to penicillin.  Tell the doctors and nurses at the hospital that you tested positive for group B strep.  When should you call for help?   Call your doctor now or seek immediate medical care if:    You have symptoms of a urinary tract infection. These may include:  Pain or burning when you urinate.  A frequent need to urinate without being able to pass much urine.  Pain in the flank, which is just below the rib cage and above the waist on either side of the back.  Blood in your urine.  A fever.     You think you are in labor or your water has broken.     You have pain in your belly or pelvis.   Watch closely for changes in your health, and be sure to contact your doctor if you have any problems.  Where can you learn more?  Go to https://www.Addashop.net/patiented  Enter M001 in the search box to learn more about \"Group B Strep During Pregnancy: Care Instructions.\"  Current as of: June 12, 2023  Content Version: 14.2 2024 [facility] [employer].   Care instructions adapted under license by your healthcare professional. If you have questions about a medical condition or this instruction, always ask your healthcare professional. Healthwise, Incorporated disclaims any warranty or liability for your use of this information.    "

## 2024-12-11 NOTE — PROGRESS NOTES
Doing well.  No concerns today.  Cervix check next visit.  Prenatal flowsheet information is reviewed.  Reportable signs and symptoms discussed.    She is doing well. She does not have ruq, blurry vision, headaches. Swelling is stable.

## 2024-12-31 ENCOUNTER — PRENATAL OFFICE VISIT (OUTPATIENT)
Dept: FAMILY MEDICINE | Facility: CLINIC | Age: 39
End: 2024-12-31
Payer: COMMERCIAL

## 2024-12-31 VITALS
SYSTOLIC BLOOD PRESSURE: 116 MMHG | HEIGHT: 67 IN | BODY MASS INDEX: 30.13 KG/M2 | RESPIRATION RATE: 17 BRPM | TEMPERATURE: 97.8 F | DIASTOLIC BLOOD PRESSURE: 70 MMHG | HEART RATE: 84 BPM | OXYGEN SATURATION: 97 % | WEIGHT: 192 LBS

## 2024-12-31 DIAGNOSIS — O09.529 SUPERVISION OF HIGH-RISK PREGNANCY OF ELDERLY MULTIGRAVIDA: Primary | ICD-10-CM

## 2024-12-31 PROCEDURE — 99207 PR PRENATAL VISIT: CPT | Performed by: FAMILY MEDICINE

## 2024-12-31 ASSESSMENT — ASTHMA QUESTIONNAIRES
ACT_TOTALSCORE: 19
QUESTION_4 LAST FOUR WEEKS HOW OFTEN HAVE YOU USED YOUR RESCUE INHALER OR NEBULIZER MEDICATION (SUCH AS ALBUTEROL): ONE OR TWO TIMES PER DAY
QUESTION_5 LAST FOUR WEEKS HOW WOULD YOU RATE YOUR ASTHMA CONTROL: WELL CONTROLLED
QUESTION_1 LAST FOUR WEEKS HOW MUCH OF THE TIME DID YOUR ASTHMA KEEP YOU FROM GETTING AS MUCH DONE AT WORK, SCHOOL OR AT HOME: NONE OF THE TIME
QUESTION_2 LAST FOUR WEEKS HOW OFTEN HAVE YOU HAD SHORTNESS OF BREATH: THREE TO SIX TIMES A WEEK
QUESTION_3 LAST FOUR WEEKS HOW OFTEN DID YOUR ASTHMA SYMPTOMS (WHEEZING, COUGHING, SHORTNESS OF BREATH, CHEST TIGHTNESS OR PAIN) WAKE YOU UP AT NIGHT OR EARLIER THAN USUAL IN THE MORNING: NOT AT ALL
ACT_TOTALSCORE: 19

## 2024-12-31 NOTE — PROGRESS NOTES
Doing well.  No concerns today.  Cephalic position confirmed by Leopold maneuvers.  Prenatal flowsheet information is reviewed.  Discussed indications, risks, complications and failure rate of inductions.  Discussed signs of labor and when to call or come in.    She is doing well. She does not have headaches, blurry vision, ruq pain. Swelling is stable, better today.     Tightening, but no obvious contractions.

## 2024-12-31 NOTE — PATIENT INSTRUCTIONS
"Week 37 of Your Pregnancy: Care Instructions    Most babies are born between 37 and 40 weeks.   This is a good time to pack a bag to take with you to the birth. Then it will be ready to go when you are.     Learn about breastfeeding.  For example, find out about ways to hold your baby to make breastfeeding easier. And think about learning how to pump and store milk.     Know that crying is normal.  It's common for babies to cry 1 to 3 hours a day. Some cry more, and some cry less.     Learn why babies cry.  They may be hungry; have gas; need a diaper change; or feel cold, warm, tired, lonely, or tense. Sometimes they cry for unknown reasons.     Think about what will help you stay calm when your baby cries.  Taking slow, deep breaths can help. So can taking a break. It's okay to put your baby somewhere safe (like their crib) and walk away for a few minutes.     Learn about safe sleep for your baby.  Always put your baby to sleep on their back. Place them alone in a crib or bassinet with a firm, flat surface. Keep soft items like stuffed animals out of the crib.     Learn what to expect with  poop.  Your baby will have their own bowel patterns. Some babies have several bowel movements a day. Some have fewer.     Know that  babies will often have loose, yellow bowel movements.  Formula-fed babies have more formed stools. If your baby's poop looks like pellets, your baby is constipated.   Follow-up care is a key part of your treatment and safety. Be sure to make and go to all appointments, and call your doctor if you are having problems. It's also a good idea to know your test results and keep a list of the medicines you take.  Where can you learn more?  Go to https://www.[employer] Moderna.net/patiented  Enter N257 in the search box to learn more about \"Week 37 of Your Pregnancy: Care Instructions.\"  Current as of: 2024  Content Version: 14.3    2024 [facility] [employer].   Care instructions " adapted under license by your healthcare professional. If you have questions about a medical condition or this instruction, always ask your healthcare professional. [employer] disclaims any warranty or liability for your use of this information.

## 2025-01-08 ENCOUNTER — PRENATAL OFFICE VISIT (OUTPATIENT)
Dept: FAMILY MEDICINE | Facility: CLINIC | Age: 40
End: 2025-01-08
Payer: COMMERCIAL

## 2025-01-08 VITALS
SYSTOLIC BLOOD PRESSURE: 112 MMHG | DIASTOLIC BLOOD PRESSURE: 72 MMHG | RESPIRATION RATE: 16 BRPM | BODY MASS INDEX: 30.07 KG/M2 | HEART RATE: 75 BPM | OXYGEN SATURATION: 99 % | TEMPERATURE: 97.7 F | WEIGHT: 192 LBS

## 2025-01-08 DIAGNOSIS — O09.529 SUPERVISION OF HIGH-RISK PREGNANCY OF ELDERLY MULTIGRAVIDA: Primary | ICD-10-CM

## 2025-01-08 PROCEDURE — 99207 PR PRENATAL VISIT: CPT | Performed by: FAMILY MEDICINE

## 2025-01-08 ASSESSMENT — ASTHMA QUESTIONNAIRES
QUESTION_1 LAST FOUR WEEKS HOW MUCH OF THE TIME DID YOUR ASTHMA KEEP YOU FROM GETTING AS MUCH DONE AT WORK, SCHOOL OR AT HOME: NONE OF THE TIME
ACT_TOTALSCORE: 19
QUESTION_4 LAST FOUR WEEKS HOW OFTEN HAVE YOU USED YOUR RESCUE INHALER OR NEBULIZER MEDICATION (SUCH AS ALBUTEROL): ONE OR TWO TIMES PER DAY
QUESTION_5 LAST FOUR WEEKS HOW WOULD YOU RATE YOUR ASTHMA CONTROL: WELL CONTROLLED
QUESTION_3 LAST FOUR WEEKS HOW OFTEN DID YOUR ASTHMA SYMPTOMS (WHEEZING, COUGHING, SHORTNESS OF BREATH, CHEST TIGHTNESS OR PAIN) WAKE YOU UP AT NIGHT OR EARLIER THAN USUAL IN THE MORNING: NOT AT ALL
QUESTION_2 LAST FOUR WEEKS HOW OFTEN HAVE YOU HAD SHORTNESS OF BREATH: THREE TO SIX TIMES A WEEK
ACT_TOTALSCORE: 19

## 2025-01-08 ASSESSMENT — PAIN SCALES - GENERAL: PAINLEVEL_OUTOF10: NO PAIN (0)

## 2025-01-08 NOTE — PROGRESS NOTES
Doing well.  No concerns today.  Cervix exam is deferred today.  Prenatal flowsheet information is reviewed.  Discussed signs of labor and when to call or come in.    She is doing well for the most part. She does have more hip and back pain. Baby is moving well.     Not having a lot of contractions, maybe some BH at times. She does note baby moving well, has mild headaches, maybe from not sleeping, although no blurry vision, ruq pain. Mild swelling in her ankles.     Will follow up in one week.

## 2025-01-08 NOTE — PATIENT INSTRUCTIONS
Week 38 of Your Pregnancy: Care Instructions  Believe it or not, your baby is almost here. You may notice how your baby responds to sounds, warmth, cold, and light. You may even know what kind of music your baby likes.    Even if you expect a vaginal birth, it's a good idea to learn about  section ().  is the delivery of a baby through a cut (incision) in your belly. It's a major surgery, so it has more risks than a vaginal birth.   During the first 2 weeks after the birth, limit visitors. Don't allow visitors who have colds or infections. Ask visitors to wash their hands before they touch your baby. And never let anyone smoke around your baby.     Know about unplanned C-sections.  Reasons for an unplanned  include labor that slows or stops, signs of distress in your baby, and high blood pressure or other problems for you.     Know about planned C-sections.  If your baby isn't in a head-down position for delivery (breech position), your doctor may plan a . Or you may have a planned  if you're having twins or more.     Get as much help as you can while you're in the hospital.  You can learn about feeding, diapering, and bathing your baby.     Talk to your doctor or midwife about how to care for the umbilical cord stump.  If your baby will be circumcised, also ask about how to care for that.     Ask friends or family for help, as you need it.  If you can, have another adult in your home for at least 2 or 3 days after the birth.     Try to nap when your baby naps.  This may be your best chance to get some sleep.     Watch for changes in your mental health.  For the first 1 to 2 weeks after birth, it's common to cry or feel sad or irritable. If these feelings last for more than 2 weeks, you may have postpartum depression. Ask your doctor for help. Postpartum depression can be treated.   Follow-up care is a key part of your treatment and safety. Be sure to make and go  "to all appointments, and call your doctor if you are having problems. It's also a good idea to know your test results and keep a list of the medicines you take.  Where can you learn more?  Go to https://www.Le[employer]out[employer].net/patiented  Enter B044 in the search box to learn more about \"Week 38 of Your Pregnancy: Care Instructions.\"  Current as of: April 30, 2024  Content Version: 14.3    2024 [employer].   Care instructions adapted under license by your healthcare professional. If you have questions about a medical condition or this instruction, always ask your healthcare professional. [employer] disclaims any warranty or liability for your use of this information.    "

## 2025-01-14 ASSESSMENT — ASTHMA QUESTIONNAIRES
QUESTION_2 LAST FOUR WEEKS HOW OFTEN HAVE YOU HAD SHORTNESS OF BREATH: THREE TO SIX TIMES A WEEK
QUESTION_1 LAST FOUR WEEKS HOW MUCH OF THE TIME DID YOUR ASTHMA KEEP YOU FROM GETTING AS MUCH DONE AT WORK, SCHOOL OR AT HOME: NONE OF THE TIME
ACT_TOTALSCORE: 20
QUESTION_3 LAST FOUR WEEKS HOW OFTEN DID YOUR ASTHMA SYMPTOMS (WHEEZING, COUGHING, SHORTNESS OF BREATH, CHEST TIGHTNESS OR PAIN) WAKE YOU UP AT NIGHT OR EARLIER THAN USUAL IN THE MORNING: NOT AT ALL
QUESTION_5 LAST FOUR WEEKS HOW WOULD YOU RATE YOUR ASTHMA CONTROL: WELL CONTROLLED
ACT_TOTALSCORE: 20
ACT_TOTALSCORE: 20
QUESTION_4 LAST FOUR WEEKS HOW OFTEN HAVE YOU USED YOUR RESCUE INHALER OR NEBULIZER MEDICATION (SUCH AS ALBUTEROL): TWO OR THREE TIMES PER WEEK
QUESTION_3 LAST FOUR WEEKS HOW OFTEN DID YOUR ASTHMA SYMPTOMS (WHEEZING, COUGHING, SHORTNESS OF BREATH, CHEST TIGHTNESS OR PAIN) WAKE YOU UP AT NIGHT OR EARLIER THAN USUAL IN THE MORNING: NOT AT ALL
QUESTION_5 LAST FOUR WEEKS HOW WOULD YOU RATE YOUR ASTHMA CONTROL: WELL CONTROLLED
QUESTION_1 LAST FOUR WEEKS HOW MUCH OF THE TIME DID YOUR ASTHMA KEEP YOU FROM GETTING AS MUCH DONE AT WORK, SCHOOL OR AT HOME: NONE OF THE TIME
QUESTION_2 LAST FOUR WEEKS HOW OFTEN HAVE YOU HAD SHORTNESS OF BREATH: THREE TO SIX TIMES A WEEK
ACT_TOTALSCORE: 20
QUESTION_4 LAST FOUR WEEKS HOW OFTEN HAVE YOU USED YOUR RESCUE INHALER OR NEBULIZER MEDICATION (SUCH AS ALBUTEROL): TWO OR THREE TIMES PER WEEK

## 2025-01-15 ENCOUNTER — PRENATAL OFFICE VISIT (OUTPATIENT)
Dept: FAMILY MEDICINE | Facility: CLINIC | Age: 40
End: 2025-01-15
Payer: COMMERCIAL

## 2025-01-15 VITALS
BODY MASS INDEX: 30.37 KG/M2 | OXYGEN SATURATION: 100 % | SYSTOLIC BLOOD PRESSURE: 118 MMHG | WEIGHT: 193.5 LBS | HEIGHT: 67 IN | TEMPERATURE: 98.5 F | RESPIRATION RATE: 16 BRPM | DIASTOLIC BLOOD PRESSURE: 80 MMHG | HEART RATE: 100 BPM

## 2025-01-15 DIAGNOSIS — O09.529 SUPERVISION OF HIGH-RISK PREGNANCY OF ELDERLY MULTIGRAVIDA: Primary | ICD-10-CM

## 2025-01-15 PROCEDURE — 99207 PR PRENATAL VISIT: CPT | Performed by: FAMILY MEDICINE

## 2025-01-15 ASSESSMENT — PAIN SCALES - GENERAL: PAINLEVEL_OUTOF10: NO PAIN (0)

## 2025-01-15 NOTE — PROGRESS NOTES
Doing well.  No concerns today.  Cervix is posterior, finger-tip dilated and soft.  Cephalic position confirmed by Leopold maneuvers and ultrasound.  Prenatal flowsheet information is reviewed.  Reportable signs and symptoms discussed.    She has had some stable swelling. She has had no headaches, blurry vision, ruq pain. She was up for a few hours with contractions last night.

## 2025-01-21 ENCOUNTER — NURSE TRIAGE (OUTPATIENT)
Dept: NURSING | Facility: CLINIC | Age: 40
End: 2025-01-21

## 2025-01-21 ENCOUNTER — PRENATAL OFFICE VISIT (OUTPATIENT)
Dept: FAMILY MEDICINE | Facility: CLINIC | Age: 40
End: 2025-01-21
Payer: COMMERCIAL

## 2025-01-21 ENCOUNTER — HOSPITAL ENCOUNTER (OUTPATIENT)
Facility: CLINIC | Age: 40
Discharge: HOME | End: 2025-01-21
Attending: FAMILY MEDICINE | Admitting: FAMILY MEDICINE
Payer: COMMERCIAL

## 2025-01-21 VITALS
HEIGHT: 67 IN | BODY MASS INDEX: 29.82 KG/M2 | WEIGHT: 190 LBS | DIASTOLIC BLOOD PRESSURE: 76 MMHG | SYSTOLIC BLOOD PRESSURE: 110 MMHG | RESPIRATION RATE: 17 BRPM | TEMPERATURE: 97.8 F | OXYGEN SATURATION: 99 % | HEART RATE: 91 BPM

## 2025-01-21 DIAGNOSIS — O09.529 SUPERVISION OF HIGH-RISK PREGNANCY OF ELDERLY MULTIGRAVIDA: Primary | ICD-10-CM

## 2025-01-21 PROBLEM — Z36.89 ENCOUNTER FOR TRIAGE IN PREGNANT PATIENT: Status: ACTIVE | Noted: 2025-01-21

## 2025-01-21 PROCEDURE — G0463 HOSPITAL OUTPT CLINIC VISIT: HCPCS

## 2025-01-21 PROCEDURE — 250N000013 HC RX MED GY IP 250 OP 250 PS 637

## 2025-01-21 PROCEDURE — 99207 PR PRENATAL VISIT: CPT | Performed by: FAMILY MEDICINE

## 2025-01-21 RX ORDER — HYDROXYZINE HYDROCHLORIDE 25 MG/1
50 TABLET, FILM COATED ORAL ONCE
Status: COMPLETED | OUTPATIENT
Start: 2025-01-21 | End: 2025-01-21

## 2025-01-21 RX ORDER — LIDOCAINE 40 MG/G
CREAM TOPICAL
Status: DISCONTINUED | OUTPATIENT
Start: 2025-01-21 | End: 2025-01-21 | Stop reason: HOSPADM

## 2025-01-21 RX ADMIN — HYDROXYZINE HYDROCHLORIDE 50 MG: 25 TABLET ORAL at 21:00

## 2025-01-21 ASSESSMENT — ACTIVITIES OF DAILY LIVING (ADL)
ADLS_ACUITY_SCORE: 18
ADLS_ACUITY_SCORE: 18

## 2025-01-21 NOTE — PROGRESS NOTES
Doing well.  No concerns today.  Cervix is 1.5cm, soft, post, 80% effaced, baby -3, Barth 5-6.  Prenatal flowsheet information is reviewed.  Discussed indications, risks, complications and failure rate of inductions.  Discussed signs of labor and when to call or come in.  Reportable signs and symptoms discussed.    She is doing well for the most part. Does feel better from the flu last weekend.     She has had some contractions. Baby is moving well at night, less obvious movements during the day. She otherwise had a mild headache with the flu, denies blurry vision, ruq pain. Swelling is stable.     Will discuss induction with her , tentatively would consider 124 hours a day for possible induction.  She will let me know via [employer] and I can get this set up for her.

## 2025-01-22 NOTE — DISCHARGE INSTRUCTIONS
Learning About When to Call Your Doctor During Pregnancy (After 20 Weeks)  Overview  It's common to have concerns about what might be a problem when you're pregnant. Most pregnancies don't have any serious problems. But it's still important to know when to call your doctor if you have certain symptoms or signs of labor.  These are general suggestions. Your doctor may give you some more information about when to call.  When to call your doctor (after 20 weeks)  Call 911  anytime you think you may need emergency care. For example, call if:  You have severe vaginal bleeding. This means you are soaking through a pad each hour for 2 or more hours.  You have sudden, severe pain in your belly.  You have chest pain, are short of breath, or cough up blood.  You passed out (lost consciousness).  You have a seizure.  You see or feel the umbilical cord.  You think you are about to deliver your baby and can't make it safely to the hospital or birthing center.  Call your doctor now or seek immediate medical care if:  You have vaginal bleeding.  You have belly pain.  You have a fever.  You are dizzy or lightheaded, or you feel like you may faint.  You have signs of a blood clot in your leg (called a deep vein thrombosis), such as:  Pain in the calf, back of the knee, thigh, or groin.  Swelling in your leg or groin.  A color change on the leg or groin. The skin may be reddish or purplish, depending on your usual skin color.  You have symptoms of preeclampsia, such as:  Sudden swelling of your face, hands, or feet.  New vision problems (such as dimness, blurring, or seeing spots).  A severe headache.  You have a sudden release of fluid from your vagina. (You think your water broke.)  You've been having regular contractions for an hour. This means that you've had at least 6 contractions within 1 hour, even after you change your position and drink fluids.  You notice that your baby has stopped moving or is moving less than  "normal.  You have signs of heart failure, such as:  New or increased shortness of breath.  New or worse swelling in your legs, ankles, or feet.  Sudden weight gain, such as more than 2 to 3 pounds in a day or 5 pounds in a week.  Feeling so tired or weak that you cannot do your usual activities.  You have symptoms of a urinary tract infection. These may include:  Pain or burning when you urinate.  A frequent need to urinate without being able to pass much urine.  Pain in the flank, which is just below the rib cage and above the waist on either side of the back.  Blood in your urine.  Watch closely for changes in your health, and be sure to contact your doctor if:  You have vaginal discharge that smells bad.  You feel sad, anxious, or hopeless for more than a few days.  You have skin changes, such as a rash, itching, or a yellow color to your skin.  You have other concerns about your pregnancy.  If you have labor signs at 37 weeks or more  If you have signs of labor at 37 weeks or more, your doctor may tell you to call when your labor becomes more active. Symptoms of active labor include:  Contractions that are regular.  Contractions that are less than 5 minutes apart.  Contractions that are hard to talk through.  Follow-up care is a key part of your treatment and safety. Be sure to make and go to all appointments, and call your doctor if you are having problems. It's also a good idea to know your test results and keep a list of the medicines you take.  Where can you learn more?  Go to https://www.[employer].net/patiented  Enter N531 in the search box to learn more about \"Learning About When to Call Your Doctor During Pregnancy (After 20 Weeks).\"  Current as of: April 30, 2024  Content Version: 14.3    2024 [facility] [employer].   Care instructions adapted under license by your healthcare professional. If you have questions about a medical condition or this instruction, always ask your healthcare professional. " [employer], [employer] disclaims any warranty or liability for your use of this information.      [facility] Maternity: [phone]

## 2025-01-22 NOTE — PROGRESS NOTES
Pt declining admission and requesting to go home and rest with PO hydroxizine. Dr. Robertson updated and discharge orders were placed.     Discharge instructions were discussed with patient. All questions and concerns were addressed and answered.

## 2025-01-22 NOTE — TELEPHONE ENCOUNTER
"Called about half hour ago. Having contractions about 5 and one half minutes apart, lasting a minute. On going for over one hour now.    OB Triage Call      Is patient's OB/Midwife with the [facility]? [facility]- Is patient's primary OB a Midwife? No- Proceed with triage.     Reason for call: contractions    Assessment: in labor    Plan: labor and delivery    Patient plans to deliver at [facility]    Patient's primary OB Provider is Marcelo.    Per protocol recommendations Patient to be evaluated in L&D.  Patient plans to deliver at Delivering Hospital: [facility] ([phone]).  Labor and Deliver notified of patient's pending arrival.  Report given to Kristina.       Is patient's delivering hospital on divert? No      39w6d    Estimated Date of Delivery: 2025        OB History    Para Term  AB Living   3 2 2 0 0 2   SAB IAB Ectopic Multiple Live Births   0 0 0 0 2      # Outcome Date GA Lbr Oswald/2nd Weight Sex Type Anes PTL Lv   3 Current            2 Term 17 40w2d 04:03 / 01:15 3.34 kg (7 lb 5.8 oz) F Vag-Spont EPI N BELEM      Name: SASKIA,FEMALE-LILIA      Apgar1: 9  Apgar5: 9   1 Term 14 40w0d   M Vag-Spont   BELEM      Name: Mariano       No results found for: \"GBS\"       Sunshine Hsu RN   Reason for Disposition   [1] History of prior delivery (multipara) AND [2] contractions < 10 minutes apart AND [3] present 1 hour    Additional Information   Negative: Passed out (i.e., lost consciousness, collapsed and was not responding)   Negative: Shock suspected (e.g., cold/pale/clammy skin, too weak to stand, low BP, rapid pulse)   Negative: Difficult to awaken or acting confused (e.g., disoriented, slurred speech)   Negative: [1] SEVERE abdominal pain (e.g., excruciating) AND [2] constant AND [3] present > 1 hour   Negative: SEVERE bleeding (e.g., continuous red blood from vagina, or large blood clots)   Negative: Umbilical cord hanging out of the vagina (shiny, white, " "curled appearance, \"like telephone cord\")   Negative: Can see baby   Negative: Uncontrollable urge to push (i.e., feels like baby is coming out now)   Negative: Sounds like a life-threatening emergency to the triager   Negative: Pregnant < 37 weeks (i.e., )   Negative: [1] Uncertain delivery date AND [2] possibly pregnant < 37 weeks (i.e., )   Negative: [1] First baby (primipara) AND [2] contractions < 6 minutes apart  AND [3] present 2 hours    Protocols used: Pregnancy - Labor-A-AH    "

## 2025-01-22 NOTE — TELEPHONE ENCOUNTER
"OB Triage Call    Is patient's OB/Midwife with the formerly [facility]? [facility]- Is patient's primary OB a Midwife? No- Proceed with triage.     Reason for call: Pregnant 39w6d Baby #3  present  Dr told her to call before going into the hospital: [facility].    Assessment: My contractions started @3pm. Now 5 min apart. I can feel pressure in my bottom, contractions in my abd and back pain. Contractions less than 10 min apart for 30 minutes.    Plan: If contractions continue less than 10 min apart for the next 30 minutes she has been instructed to call back--we will notify L&D and she will go in. Home Care for now.    Patient plans to deliver at [facility]    Patient's primary OB Provider is Dr Farheen Robertson @ [facility]. .    Per protocol recommendations Patient to follow home care recommendations.        Is patient's delivering hospital on divert? Does not apply due to Patient given home care instructions      39w6d    Estimated Date of Delivery: 2025        OB History    Para Term  AB Living   3 2 2 0 0 2   SAB IAB Ectopic Multiple Live Births   0 0 0 0 2      # Outcome Date GA Lbr Oswald/2nd Weight Sex Type Anes PTL Lv   3 Current            2 Term 17 40w2d 04:03 / 01:15 3.34 kg (7 lb 5.8 oz) F Vag-Spont EPI N BELEM      Name: SASKIA,FEMALE-LILIA      Apgar1: 9  Apgar5: 9   1 Term 14 40w0d   M Vag-Spont   BELEM      Name: Mariano       No results found for: \"GBS\"       Alyce Carey RN    Reason for Disposition   [1] History of prior delivery (multipara) AND [2] contractions > 10 minutes OR for < 1 hour    Additional Information   Negative: Passed out (i.e., lost consciousness, collapsed and was not responding)   Negative: Shock suspected (e.g., cold/pale/clammy skin, too weak to stand, low BP, rapid pulse)   Negative: Difficult to awaken or acting confused (e.g., disoriented, slurred speech)   Negative: [1] SEVERE abdominal pain (e.g., excruciating) AND [2] constant " "AND [3] present > 1 hour   Negative: SEVERE bleeding (e.g., continuous red blood from vagina, or large blood clots)   Negative: Umbilical cord hanging out of the vagina (shiny, white, curled appearance, \"like telephone cord\")   Negative: Uncontrollable urge to push (i.e., feels like baby is coming out now)   Negative: Can see baby   Negative: Sounds like a life-threatening emergency to the triager   Negative: Pregnant < 37 weeks (i.e., )   Negative: [1] Uncertain delivery date AND [2] possibly pregnant < 37 weeks (i.e., )   Negative: [1] First baby (primipara) AND [2] contractions < 6 minutes apart  AND [3] present 2 hours   Negative: [1] History of prior delivery (multipara) AND [2] contractions < 10 minutes apart AND [3] present 1 hour   Negative: [1] History of rapid prior delivery AND [2] contractions < 10 minutes apart   Negative: [1] Leakage of fluid from vagina AND [2] green or brown in color   Negative: Leakage of fluid from vagina   Negative: Vaginal bleeding or spotting  (Exception: Brief spotting after intercourse or pelvic exam.)   Negative: Baby moving less today (e.g., kick count < 5 in 1 hour or < 10 in 2 hours)   Negative: Severe headache or headache that won't go away   Negative: New blurred vision or vision changes   Negative: MODERATE-SEVERE abdominal pain   Negative: [1] MILD abdominal pain (e.g., doesn't interfere with normal activities) AND [2] constant AND [3] present > 2 hours   Negative: Fever 100.4 F (38.0 C) or higher   Negative: Patient sounds very sick or weak to the triager   Negative: [1] First baby (primipara) AND [2] contractions < 10 minutes apart AND [3] present 4 hours or longer   Negative: [1] First baby (primipara) AND [2] contractions > 5 minutes apart OR for < 2 hours    Protocols used: Pregnancy - Labor-A-    "

## 2025-01-22 NOTE — PROGRESS NOTES
Data: Patient presented to [facility]: 2025  7:46 PM.  Reason for maternal/fetal assessment is uterine contractions. Patient reports contractions beginning at 1500 today and that since 1800 they've been approximately every 5 minutes apart. Patient denies vaginal bleeding, abdominal pain, nausea, vomiting, headache, visual disturbances, epigastric or RUQ pain, significant edema. Patient reports fetal movement is normal. Patient is a 39w6d . Prenatal record reviewed. Pregnancy has been uncomplicated. Support person is present.     Fetal HR baseline was 130, variability is moderate (amplitude range 6 to 25 bpm). Accelerations: increase 15 bpm above baseline lasting 15 seconds. Decelerations: absent. Uterine assessment is moderate by palpation during contractions and soft by palpation at rest. Cervix 3 cm dilated and 60% effaced. Fetal station -1. Fetal presentation  vertex per Leopolds and cervical exam. Membranes: intact.    Vital signs wnl. Patient reports pain and is coping.     Action: Verbal consent for EFM. Triage assessment completed. Patient may meet criteria for early labor discharge.     Response: Patient verbalized understanding of triage assessment. Will contact BFM Resident and Dr. Robertson with assessment and consideration of early labor discharge vs admission.

## 2025-01-22 NOTE — PROGRESS NOTES
.OB Triage Note        Assessment and Plan:     Esetla Ramirez is a 39 year old  at 39w6d not in active labor. Contractions are q5 minutes apart, some cervical change since this morning but still able to speak through contractions. Would likely benefit from sleep and/or ambulation prior to going into active labor. Options discussed with the patient.     Patient Active Problem List   Diagnosis    Asthma    Lower back pain    Nicotine Dependence    Vitamin D deficiency    MTHFR mutation    History of superficial phlebitis    Varicose vein of leg    Lumbar radiculopathy    Pollen-food allergy, initial encounter    Seasonal allergic rhinitis due to pollen    Encounter for triage in pregnant patient       Discharge home with return precautions including increased frequency and intensity of contractions, rupture of membranes,   Hydroxyzine 50mg prior to discharge for therapeutic sleep.    Patient discussed with attending physician, Dr. Robertson who agrees with the plan.      Danya Salazar MD PGY1 2025  [facility]       Subjective:     Estela Ramirez is a  39 year old female at 39w6d with pmhx of asthma and overall uncomplicated prenatal history except for mild intermittent headaches, mild LE edema, and influenza during pregnancy. She presents to OB triage with q5 minute contractions.    Estela Ramirez is a patient of Farheen Monae    She reports contractions since morning that have become more regular since about 6PM, first q10 minutes and then become more frequent. She feels pressure in her lower abdomen and back but is still able to speak through her contractions.  She denies fluid leakage. She denies bleeding per vagina. Fetal movement is normal.  Estimated Date of Delivery: 2025 Patient's last menstrual period was 2024 (exact date).     Her prenatal course has been uncomplicated.    She has received the RSV vaccine.  "    Prenatal labs:   Lab Results   Component Value Date    AS Negative 07/03/2024    HEPBANG Nonreactive 07/03/2024    CHPCRT Negative 07/03/2024    GCPCRT Negative 07/03/2024    HGB 11.2 (L) 11/01/2024          Review of Systems:   EYES: no acute vision problems or changes  RESP: no significant cough, no shortness of breath  CV: no chest pain, no palpitations, no new or worsening peripheral edema         Physical Exam:   Vitals:   BP (P) 124/60 (BP Location: Right arm, Patient Position: Left side, Cuff Size: Adult Regular)   Temp (P) 98.2  F (36.8  C) (Oral)   Resp (P) 16   Ht (P) 1.702 m (5' 7\")   Wt (P) 86.2 kg (190 lb)   LMP 04/17/2024 (Exact Date)   SpO2 (P) 97%   BMI (P) 29.76 kg/m    0 lbs 0 oz  Estimated body mass index is 29.76 kg/m  (pended) as calculated from the following:    Height as of this encounter: (P) 1.702 m (5' 7\").    Weight as of this encounter: (P) 86.2 kg (190 lb).    GEN: Awake, alert in no apparent distress   HEENT: grossly normal  NECK: no lymphadenopathy or thryoidomegaly  RESPIRATORY: clear to auscultation bilaterally, no increased work of breathing  BACK:  no costovertebral angle tenderness   CARDIOVASCULAR: RRR, no murmur  ABDOMEN: gravid  Cervical exam per RN: 3/60%/-1  EXT:  mild bilateral edema    NST interpretation:  Baseline rate 130 normal  Accelerations present  Decelerations not present  Interpretation: reactive    Labs today:  No results found for any visits on 01/21/25.   "

## 2025-01-23 ENCOUNTER — HOSPITAL ENCOUNTER (INPATIENT)
Facility: CLINIC | Age: 40
LOS: 1 days | Discharge: HOME | End: 2025-01-24
Attending: FAMILY MEDICINE | Admitting: FAMILY MEDICINE
Payer: COMMERCIAL

## 2025-01-23 PROBLEM — O09.529 ENCOUNTER FOR SUPERVISION OF HIGH-RISK PREGNANCY WITH ELDERLY MULTIGRAVIDA: Status: ACTIVE | Noted: 2025-01-23

## 2025-01-23 LAB
ABO + RH BLD: NORMAL
BLD GP AB SCN SERPL QL: NEGATIVE
HGB BLD-MCNC: 13.2 G/DL (ref 11.7–15.7)
SPECIMEN EXP DATE BLD: NORMAL
T PALLIDUM AB SER QL: NONREACTIVE

## 2025-01-23 PROCEDURE — 250N000013 HC RX MED GY IP 250 OP 250 PS 637: Performed by: FAMILY MEDICINE

## 2025-01-23 PROCEDURE — 250N000011 HC RX IP 250 OP 636: Performed by: FAMILY MEDICINE

## 2025-01-23 PROCEDURE — 3E0R3BZ INTRODUCTION OF ANESTHETIC AGENT INTO SPINAL CANAL, PERCUTANEOUS APPROACH: ICD-10-PCS | Performed by: ANESTHESIOLOGY

## 2025-01-23 PROCEDURE — 120N000001 HC R&B MED SURG/OB

## 2025-01-23 PROCEDURE — 250N000009 HC RX 250: Performed by: FAMILY MEDICINE

## 2025-01-23 PROCEDURE — 00HU33Z INSERTION OF INFUSION DEVICE INTO SPINAL CANAL, PERCUTANEOUS APPROACH: ICD-10-PCS | Performed by: ANESTHESIOLOGY

## 2025-01-23 PROCEDURE — 86900 BLOOD TYPING SEROLOGIC ABO: CPT | Performed by: FAMILY MEDICINE

## 2025-01-23 PROCEDURE — 250N000011 HC RX IP 250 OP 636: Performed by: ANESTHESIOLOGY

## 2025-01-23 PROCEDURE — 86780 TREPONEMA PALLIDUM: CPT | Performed by: FAMILY MEDICINE

## 2025-01-23 PROCEDURE — 85018 HEMOGLOBIN: CPT | Performed by: FAMILY MEDICINE

## 2025-01-23 PROCEDURE — 86850 RBC ANTIBODY SCREEN: CPT | Performed by: FAMILY MEDICINE

## 2025-01-23 PROCEDURE — 722N000001 HC LABOR CARE VAGINAL DELIVERY SINGLE

## 2025-01-23 PROCEDURE — 0UQGXZZ REPAIR VAGINA, EXTERNAL APPROACH: ICD-10-PCS | Performed by: FAMILY MEDICINE

## 2025-01-23 PROCEDURE — 258N000003 HC RX IP 258 OP 636: Performed by: FAMILY MEDICINE

## 2025-01-23 PROCEDURE — 36415 COLL VENOUS BLD VENIPUNCTURE: CPT | Performed by: FAMILY MEDICINE

## 2025-01-23 PROCEDURE — 59400 OBSTETRICAL CARE: CPT | Performed by: FAMILY MEDICINE

## 2025-01-23 RX ORDER — ACETAMINOPHEN 325 MG/1
650 TABLET ORAL EVERY 4 HOURS PRN
Status: DISCONTINUED | OUTPATIENT
Start: 2025-01-23 | End: 2025-01-23 | Stop reason: HOSPADM

## 2025-01-23 RX ORDER — CARBOPROST TROMETHAMINE 250 UG/ML
250 INJECTION, SOLUTION INTRAMUSCULAR
Status: DISCONTINUED | OUTPATIENT
Start: 2025-01-23 | End: 2025-01-24 | Stop reason: HOSPADM

## 2025-01-23 RX ORDER — SODIUM CHLORIDE, SODIUM LACTATE, POTASSIUM CHLORIDE, CALCIUM CHLORIDE 600; 310; 30; 20 MG/100ML; MG/100ML; MG/100ML; MG/100ML
INJECTION, SOLUTION INTRAVENOUS CONTINUOUS PRN
Status: DISCONTINUED | OUTPATIENT
Start: 2025-01-23 | End: 2025-01-23 | Stop reason: HOSPADM

## 2025-01-23 RX ORDER — PROCHLORPERAZINE MALEATE 10 MG
10 TABLET ORAL EVERY 6 HOURS PRN
Status: DISCONTINUED | OUTPATIENT
Start: 2025-01-23 | End: 2025-01-23 | Stop reason: HOSPADM

## 2025-01-23 RX ORDER — OXYTOCIN 10 [USP'U]/ML
10 INJECTION, SOLUTION INTRAMUSCULAR; INTRAVENOUS
Status: DISCONTINUED | OUTPATIENT
Start: 2025-01-23 | End: 2025-01-24 | Stop reason: HOSPADM

## 2025-01-23 RX ORDER — EPHEDRINE SULFATE 50 MG/ML
5 INJECTION, SOLUTION INTRAMUSCULAR; INTRAVENOUS; SUBCUTANEOUS
Status: DISCONTINUED | OUTPATIENT
Start: 2025-01-23 | End: 2025-01-23 | Stop reason: HOSPADM

## 2025-01-23 RX ORDER — LOPERAMIDE HYDROCHLORIDE 2 MG/1
4 CAPSULE ORAL
Status: DISCONTINUED | OUTPATIENT
Start: 2025-01-23 | End: 2025-01-24 | Stop reason: HOSPADM

## 2025-01-23 RX ORDER — NALOXONE HYDROCHLORIDE 0.4 MG/ML
0.2 INJECTION, SOLUTION INTRAMUSCULAR; INTRAVENOUS; SUBCUTANEOUS
Status: DISCONTINUED | OUTPATIENT
Start: 2025-01-23 | End: 2025-01-23 | Stop reason: HOSPADM

## 2025-01-23 RX ORDER — CARBOPROST TROMETHAMINE 250 UG/ML
250 INJECTION, SOLUTION INTRAMUSCULAR
Status: DISCONTINUED | OUTPATIENT
Start: 2025-01-23 | End: 2025-01-23 | Stop reason: HOSPADM

## 2025-01-23 RX ORDER — FENTANYL/ROPIVACAINE/NS/PF 2MCG/ML-.1
PLASTIC BAG, INJECTION (ML) EPIDURAL
Status: DISCONTINUED | OUTPATIENT
Start: 2025-01-23 | End: 2025-01-23 | Stop reason: HOSPADM

## 2025-01-23 RX ORDER — KETOROLAC TROMETHAMINE 30 MG/ML
30 INJECTION, SOLUTION INTRAMUSCULAR; INTRAVENOUS
Status: COMPLETED | OUTPATIENT
Start: 2025-01-23 | End: 2025-01-23

## 2025-01-23 RX ORDER — DOCUSATE SODIUM 100 MG/1
100 CAPSULE, LIQUID FILLED ORAL DAILY
Status: DISCONTINUED | OUTPATIENT
Start: 2025-01-23 | End: 2025-01-24 | Stop reason: HOSPADM

## 2025-01-23 RX ORDER — HYDROCORTISONE 25 MG/G
CREAM TOPICAL 3 TIMES DAILY PRN
Status: DISCONTINUED | OUTPATIENT
Start: 2025-01-23 | End: 2025-01-24 | Stop reason: HOSPADM

## 2025-01-23 RX ORDER — METOCLOPRAMIDE 10 MG/1
10 TABLET ORAL EVERY 6 HOURS PRN
Status: DISCONTINUED | OUTPATIENT
Start: 2025-01-23 | End: 2025-01-23 | Stop reason: HOSPADM

## 2025-01-23 RX ORDER — OXYTOCIN/0.9 % SODIUM CHLORIDE 30/500 ML
1-24 PLASTIC BAG, INJECTION (ML) INTRAVENOUS CONTINUOUS
Status: DISCONTINUED | OUTPATIENT
Start: 2025-01-23 | End: 2025-01-23 | Stop reason: HOSPADM

## 2025-01-23 RX ORDER — METOCLOPRAMIDE HYDROCHLORIDE 5 MG/ML
10 INJECTION INTRAMUSCULAR; INTRAVENOUS EVERY 6 HOURS PRN
Status: DISCONTINUED | OUTPATIENT
Start: 2025-01-23 | End: 2025-01-23 | Stop reason: HOSPADM

## 2025-01-23 RX ORDER — FENTANYL CITRATE 50 UG/ML
50 INJECTION, SOLUTION INTRAMUSCULAR; INTRAVENOUS EVERY 30 MIN PRN
Status: DISCONTINUED | OUTPATIENT
Start: 2025-01-23 | End: 2025-01-23 | Stop reason: HOSPADM

## 2025-01-23 RX ORDER — MONTELUKAST SODIUM 10 MG/1
10 TABLET ORAL AT BEDTIME
Status: DISCONTINUED | OUTPATIENT
Start: 2025-01-24 | End: 2025-01-24 | Stop reason: HOSPADM

## 2025-01-23 RX ORDER — IBUPROFEN 800 MG/1
800 TABLET, FILM COATED ORAL EVERY 6 HOURS PRN
Status: DISCONTINUED | OUTPATIENT
Start: 2025-01-23 | End: 2025-01-24 | Stop reason: HOSPADM

## 2025-01-23 RX ORDER — LIDOCAINE 40 MG/G
CREAM TOPICAL
Status: DISCONTINUED | OUTPATIENT
Start: 2025-01-23 | End: 2025-01-23 | Stop reason: HOSPADM

## 2025-01-23 RX ORDER — OXYTOCIN 10 [USP'U]/ML
10 INJECTION, SOLUTION INTRAMUSCULAR; INTRAVENOUS
Status: DISCONTINUED | OUTPATIENT
Start: 2025-01-23 | End: 2025-01-23 | Stop reason: HOSPADM

## 2025-01-23 RX ORDER — METHYLERGONOVINE MALEATE 0.2 MG/ML
200 INJECTION INTRAVENOUS
Status: DISCONTINUED | OUTPATIENT
Start: 2025-01-23 | End: 2025-01-23 | Stop reason: HOSPADM

## 2025-01-23 RX ORDER — IBUPROFEN 800 MG/1
800 TABLET, FILM COATED ORAL
Status: COMPLETED | OUTPATIENT
Start: 2025-01-23 | End: 2025-01-23

## 2025-01-23 RX ORDER — MISOPROSTOL 200 UG/1
400 TABLET ORAL
Status: DISCONTINUED | OUTPATIENT
Start: 2025-01-23 | End: 2025-01-23 | Stop reason: HOSPADM

## 2025-01-23 RX ORDER — MISOPROSTOL 200 UG/1
400 TABLET ORAL
Status: DISCONTINUED | OUTPATIENT
Start: 2025-01-23 | End: 2025-01-24 | Stop reason: HOSPADM

## 2025-01-23 RX ORDER — LOPERAMIDE HYDROCHLORIDE 2 MG/1
4 CAPSULE ORAL
Status: DISCONTINUED | OUTPATIENT
Start: 2025-01-23 | End: 2025-01-23 | Stop reason: HOSPADM

## 2025-01-23 RX ORDER — TRANEXAMIC ACID 10 MG/ML
1 INJECTION, SOLUTION INTRAVENOUS EVERY 30 MIN PRN
Status: DISCONTINUED | OUTPATIENT
Start: 2025-01-23 | End: 2025-01-24 | Stop reason: HOSPADM

## 2025-01-23 RX ORDER — MISOPROSTOL 200 UG/1
800 TABLET ORAL
Status: DISCONTINUED | OUTPATIENT
Start: 2025-01-23 | End: 2025-01-23 | Stop reason: HOSPADM

## 2025-01-23 RX ORDER — MODIFIED LANOLIN
OINTMENT (GRAM) TOPICAL
Status: DISCONTINUED | OUTPATIENT
Start: 2025-01-23 | End: 2025-01-24 | Stop reason: HOSPADM

## 2025-01-23 RX ORDER — NALOXONE HYDROCHLORIDE 0.4 MG/ML
0.4 INJECTION, SOLUTION INTRAMUSCULAR; INTRAVENOUS; SUBCUTANEOUS
Status: DISCONTINUED | OUTPATIENT
Start: 2025-01-23 | End: 2025-01-23 | Stop reason: HOSPADM

## 2025-01-23 RX ORDER — TRANEXAMIC ACID 10 MG/ML
1 INJECTION, SOLUTION INTRAVENOUS EVERY 30 MIN PRN
Status: DISCONTINUED | OUTPATIENT
Start: 2025-01-23 | End: 2025-01-23 | Stop reason: HOSPADM

## 2025-01-23 RX ORDER — OXYTOCIN/0.9 % SODIUM CHLORIDE 30/500 ML
340 PLASTIC BAG, INJECTION (ML) INTRAVENOUS CONTINUOUS PRN
Status: DISCONTINUED | OUTPATIENT
Start: 2025-01-23 | End: 2025-01-24 | Stop reason: HOSPADM

## 2025-01-23 RX ORDER — ACETAMINOPHEN 325 MG/1
650 TABLET ORAL EVERY 4 HOURS PRN
Status: DISCONTINUED | OUTPATIENT
Start: 2025-01-23 | End: 2025-01-24 | Stop reason: HOSPADM

## 2025-01-23 RX ORDER — LOPERAMIDE HYDROCHLORIDE 2 MG/1
2 CAPSULE ORAL
Status: DISCONTINUED | OUTPATIENT
Start: 2025-01-23 | End: 2025-01-24 | Stop reason: HOSPADM

## 2025-01-23 RX ORDER — PRENATAL VIT/IRON FUM/FOLIC AC 27MG-0.8MG
1 TABLET ORAL DAILY
Status: DISCONTINUED | OUTPATIENT
Start: 2025-01-23 | End: 2025-01-24 | Stop reason: HOSPADM

## 2025-01-23 RX ORDER — MISOPROSTOL 200 UG/1
800 TABLET ORAL
Status: DISCONTINUED | OUTPATIENT
Start: 2025-01-23 | End: 2025-01-24 | Stop reason: HOSPADM

## 2025-01-23 RX ORDER — CITRIC ACID/SODIUM CITRATE 334-500MG
30 SOLUTION, ORAL ORAL
Status: DISCONTINUED | OUTPATIENT
Start: 2025-01-23 | End: 2025-01-23 | Stop reason: HOSPADM

## 2025-01-23 RX ORDER — NALBUPHINE HYDROCHLORIDE 10 MG/ML
2.5-5 INJECTION INTRAMUSCULAR; INTRAVENOUS; SUBCUTANEOUS EVERY 6 HOURS PRN
Status: DISCONTINUED | OUTPATIENT
Start: 2025-01-23 | End: 2025-01-24 | Stop reason: HOSPADM

## 2025-01-23 RX ORDER — LOPERAMIDE HYDROCHLORIDE 2 MG/1
2 CAPSULE ORAL
Status: DISCONTINUED | OUTPATIENT
Start: 2025-01-23 | End: 2025-01-23 | Stop reason: HOSPADM

## 2025-01-23 RX ORDER — OXYTOCIN/0.9 % SODIUM CHLORIDE 30/500 ML
340 PLASTIC BAG, INJECTION (ML) INTRAVENOUS CONTINUOUS PRN
Status: DISCONTINUED | OUTPATIENT
Start: 2025-01-23 | End: 2025-01-23 | Stop reason: HOSPADM

## 2025-01-23 RX ORDER — OXYTOCIN/0.9 % SODIUM CHLORIDE 30/500 ML
100-340 PLASTIC BAG, INJECTION (ML) INTRAVENOUS CONTINUOUS PRN
Status: DISCONTINUED | OUTPATIENT
Start: 2025-01-23 | End: 2025-01-24 | Stop reason: HOSPADM

## 2025-01-23 RX ORDER — CETIRIZINE HYDROCHLORIDE 10 MG/1
10 TABLET ORAL DAILY
Status: DISCONTINUED | OUTPATIENT
Start: 2025-01-23 | End: 2025-01-24 | Stop reason: HOSPADM

## 2025-01-23 RX ORDER — ONDANSETRON 2 MG/ML
4 INJECTION INTRAMUSCULAR; INTRAVENOUS EVERY 6 HOURS PRN
Status: DISCONTINUED | OUTPATIENT
Start: 2025-01-23 | End: 2025-01-23 | Stop reason: HOSPADM

## 2025-01-23 RX ORDER — METHYLERGONOVINE MALEATE 0.2 MG/ML
200 INJECTION INTRAVENOUS
Status: DISCONTINUED | OUTPATIENT
Start: 2025-01-23 | End: 2025-01-24 | Stop reason: HOSPADM

## 2025-01-23 RX ORDER — BISACODYL 10 MG
10 SUPPOSITORY, RECTAL RECTAL DAILY PRN
Status: DISCONTINUED | OUTPATIENT
Start: 2025-01-23 | End: 2025-01-24 | Stop reason: HOSPADM

## 2025-01-23 RX ORDER — ONDANSETRON 4 MG/1
4 TABLET, ORALLY DISINTEGRATING ORAL EVERY 6 HOURS PRN
Status: DISCONTINUED | OUTPATIENT
Start: 2025-01-23 | End: 2025-01-23 | Stop reason: HOSPADM

## 2025-01-23 RX ADMIN — KETOROLAC TROMETHAMINE 30 MG: 30 INJECTION, SOLUTION INTRAMUSCULAR at 16:57

## 2025-01-23 RX ADMIN — Medication 2 MILLI-UNITS/MIN: at 08:59

## 2025-01-23 RX ADMIN — BENZOCAINE AND LEVOMENTHOL: 200; 5 SPRAY TOPICAL at 17:39

## 2025-01-23 RX ADMIN — CETIRIZINE HYDROCHLORIDE 10 MG: 10 TABLET, FILM COATED ORAL at 21:10

## 2025-01-23 RX ADMIN — SODIUM CHLORIDE, POTASSIUM CHLORIDE, SODIUM LACTATE AND CALCIUM CHLORIDE 1000 ML: 600; 310; 30; 20 INJECTION, SOLUTION INTRAVENOUS at 14:18

## 2025-01-23 RX ADMIN — ACETAMINOPHEN 650 MG: 325 TABLET ORAL at 21:41

## 2025-01-23 RX ADMIN — Medication: at 14:35

## 2025-01-23 RX ADMIN — SODIUM CHLORIDE, POTASSIUM CHLORIDE, SODIUM LACTATE AND CALCIUM CHLORIDE 100 ML/HR: 600; 310; 30; 20 INJECTION, SOLUTION INTRAVENOUS at 08:58

## 2025-01-23 RX ADMIN — DOCUSATE SODIUM 100 MG: 100 CAPSULE, LIQUID FILLED ORAL at 21:10

## 2025-01-23 RX ADMIN — ACETAMINOPHEN 650 MG: 325 TABLET ORAL at 17:39

## 2025-01-23 RX ADMIN — PRENATAL VIT W/ FE FUMARATE-FA TAB 27-0.8 MG 1 TABLET: 27-0.8 TAB at 21:10

## 2025-01-23 ASSESSMENT — ACTIVITIES OF DAILY LIVING (ADL)
ADLS_ACUITY_SCORE: 22
ADLS_ACUITY_SCORE: 22
ADLS_ACUITY_SCORE: 18
ADLS_ACUITY_SCORE: 22
ADLS_ACUITY_SCORE: 18
ADLS_ACUITY_SCORE: 18
ADLS_ACUITY_SCORE: 22
ADLS_ACUITY_SCORE: 18
ADLS_ACUITY_SCORE: 22
ADLS_ACUITY_SCORE: 18
ADLS_ACUITY_SCORE: 18
ADLS_ACUITY_SCORE: 22
ADLS_ACUITY_SCORE: 18
ADLS_ACUITY_SCORE: 22

## 2025-01-23 NOTE — PROGRESS NOTES
Pt admitted for induction of labor.  SVE 3/40-50%/ballottable/posterior/medium consistency, Category 1 tracing, occasional ctxs that pt doesn't rarely notices. Intact BOW.  Dr Robertson called and provided about information.  Plan is to proceed with pitocin induction and pt desires an epidural in active labor.  Pt agreeable with plan and expresses hope to delivery her baby today.  Alie Richards RN

## 2025-01-23 NOTE — H&P
[facility]    History and Physical  Obstetrics and Gynecology     Date of Admission:  2025    Assessment & Plan   Estela Ramirez is a 39 year old female who presents for IOL  ASSESSMENT:   IUP @ 40w1d for induction of labor.  Indication post dates, elective.  NST reactive.  Category  I    PLAN:   Admit - see IP orders  Labor induction with Pitocin  Pain medication epidural when in labor  Anticipate     Farheen Robertson MD    History of Present Illness   Estela Ramirez is a 39 year old female  40w1d  Estimated Date of Delivery: 25 is calculated from Patient's last menstrual period was 2024 (exact date). is [facility] the [facility]  for induction of labor.  Indication post dates, elective.    PRENATAL COURSE  Prenatal course was essentially uncomplicated, patient is older than 35    Recent Labs   Lab Test 25  0806   AS Negative     Rhogam not indicated   Recent Labs   Lab Test 24  1653   HEPBANG Nonreactive   HIAGAB Nonreactive   RUQIGG Positive       Past Medical History    I have reviewed this patient's medical history and updated it with pertinent information if needed.   Past Medical History:   Diagnosis Date    Asthma     MTHFR mutation        Past Surgical History   I have reviewed this patient's surgical history and updated it with pertinent information if needed.  Past Surgical History:   Procedure Laterality Date    HEMILAMINOTOMY LUMBAR SPINE Left 10/14/2003    L4-5    IR LUMBAR EPIDURAL STEROID INJECTION  2006       Prior to Admission Medications   Prior to Admission Medications   Prescriptions Last Dose Informant Patient Reported? Taking?   EPINEPHrine (ANY BX GENERIC EQUIV) 0.3 MG/0.3ML injection 2-pack More than a month  No Yes   Sig: Inject into outer thigh for allergic reaction   Prenatal Vit-Fe Fumarate-FA (PRENATAL MULTIVITAMIN W/IRON) 27-0.8 MG tablet 2025  No Yes   Sig: Take 1 tablet by mouth daily   albuterol (PROAIR  HFA/PROVENTIL HFA/VENTOLIN HFA) 108 (90 Base) MCG/ACT inhaler 1/22/2025  No Yes   Sig: USE 2 INHALATIONS BY MOUTH  EVERY 6 HOURS AS NEEDED FOR WHEEZING   betamethasone dipropionate (DIPROSONE) 0.05 % external cream More than a month  No Yes   Sig: Apply topically 2 times daily as needed.   cetirizine (ZYRTEC) 10 MG tablet 1/22/2025  Yes Yes   Sig: Take 10 mg by mouth daily.   fluticasone (FLONASE) 50 MCG/ACT nasal spray More than a month  No Yes   Sig: Spray 2 sprays into both nostrils daily.   fluticasone-salmeterol (WIXELA INHUB) 100-50 MCG/ACT inhaler 1/23/2025 Morning  No Yes   Sig: USE 1 INHALATION BY MOUTH  TWICE DAILY   montelukast (SINGULAIR) 10 MG tablet 1/23/2025 Morning  No Yes   Sig: Take 1 tablet (10 mg) by mouth at bedtime   triamcinolone (KENALOG) 0.1 % external cream More than a month  No Yes   Sig: Apply topically 2 times daily as needed for irritation      Facility-Administered Medications: None     Allergies   Allergies   Allergen Reactions    Cephalosporins Hives    Oat        Social History   I have reviewed this patient's social history and updated it with pertinent information if needed. Estela Ramirez  reports that she has been smoking cigarettes. She started smoking about 22 years ago. She has a 5.5 pack-year smoking history. She has been exposed to tobacco smoke. She does not have any smokeless tobacco history on file. She reports that she does not currently use alcohol. She reports that she does not use drugs.    Family History   I have reviewed this patient's family history and updated it with pertinent information if needed.   Family History   Problem Relation Age of Onset    Varicose Veins Mother     Pancreatic Cancer Brother     Pancreatic Cancer Maternal Grandmother     Throat cancer Maternal Grandfather     Diabetes Father     Asthma Father     Depression Father        Immunization History   Immunizations are up to date    Physical Exam   Temp: 98.1  F (36.7  C) Temp src: Oral BP:  130/70 Pulse: 98   Resp: 16   O2 Device: None (Room air)    Vital Signs with Ranges  Temp:  [97.9  F (36.6  C)-98.2  F (36.8  C)] 98.1  F (36.7  C)  Pulse:  [98] 98  Resp:  [14-16] 16  BP: (101-130)/(59-76) 130/70    Abdomen: gravid, single vertex fetus, non-tender,  Cervical Exam: 3/ 40/ Posterior/ average/ -3     Fetal Heart Tones: moderate variablility, + accels, no decels, and Category I  TOCO:   frequency q 2-4 minutes    Constitutional: healthy, alert, and active   Respiratory: cough present  Skin/Extremites: no bruising or bleeding  Neurologic: Mental Status Exam:  Level of Alertness:   awake  Orientation:   person, place, time  Neuropsychiatric: General: normal, calm, and normal eye contact

## 2025-01-23 NOTE — L&D DELIVERY NOTE
OB Vaginal Delivery Note    Estela Ramirez MRN# [id number]   Age: 39 year old YOB: 1985       GA: 40w1d  GP:   Labor Complications: None  EBL:   mL  Delivery QBL:    Delivery Type: Vaginal, Spontaneous  ROM to Delivery Time: (Delivered) Minutes: 30   Weight: 3.572 kg (7 lb 14 oz)   1 Minute 5 Minute 10 Minute   Apgar Totals: 8   9        MAURICE DENTON;MONICA DENNISON    Delivery Details:  Estela Ramirez, a 39 year old  female delivered a viable infant with apgars of 8  and 9 . Patient was fully dilated and pushing after   hours   minutes in active labor. Delivery was via vaginal, spontaneous to a sterile field under epidural anesthesia. Infant delivered in vertex   occiput anterior position. Anterior and posterior shoulders delivered without difficulty. The cord was clamped, cut twice and 3 vessels were noted. Cord blood was obtained in routine fashion with the following disposition: discard.      Cord complications: nuchal  Placenta delivered at 2025  3:51 PM. Placental disposition was  . Fundal massage performed and fundus found to be firm.     Episiotomy: none   Perineum, vagina, cervix were inspected, and the following lacerations were noted:   Perineal lacerations:    periurethral laceration: bilateral     vaginal laceration noted       Any lacerations were repaired in the usual fashion using 3-0 Vicryl.There was a very small vaginal tear that I did repair in the typical fashion. She did have bilateral periurethral tears. The left one had good hemostasis and was shallow. The one on her right was a bit deeper and I did put a single stitch in this one to re approximate.     Excellent hemostasis was noted. Needle count correct. Infant and patient in delivery room in good and stable condition.        James Female-Estela [[id number]]      Labor Event Times      Active labor onset date: 25 Onset time:  1:40 PM   Dilation complete date: 25 Complete time:  3:40 PM   Start  "pushing date/time: 2025 1542          Rupture date/time: 25 1515   Rupture type: Spontaneous Rupture of Membranes  Fluid color: Clear  Fluid odor: Normal     Induction: Oxytocin  Induction date/time:      Cervical ripening date/time:      Indications for induction: Elective, Advanced Maternal Age     Augmentation: Oxytocin       Delivery/Placenta Date and Time      Delivery Date: 25 Delivery Time:  3:45 PM   Placenta Date/Time: 2025  3:51 PM  Oxytocin given at the time of delivery: after delivery of baby  Delivering clinician: Farheen Kaur MD   Other personnel present at delivery:  Provider Role   Alie Denton RN Delivery Nurse   Marry Al RN Delivery Assist             Vaginal Counts       Initial count performed by 2 team members:  Two Team Members   lola kaur         [place] Suture Needles Sponges (RETIRED) Instruments   Initial counts 0 0 0    Added to count 0 1 0    Relief counts       Final counts 0 1 0            Placed during labor Accounted for at the end of labor   FSE NA NA   IUPC NA NA   Cervidil NA NA                  Final count performed by 2 team members:  Two Team Members   lola kaur      Final count correct?: Yes  Pre-Birth Team Brief: Complete  Post-Birth Team Debrief: Complete       Apgars    Living status: Living   1 Minute 5 Minute 10 Minute 15 Minute 20 Minute   Skin color: 0  1       Heart rate: 2  2       Reflex irritability: 2  2       Muscle tone: 2  2       Respiratory effort: 2  2       Total: 8  9       Apgars assigned by: LOLA DENTON       Cord      Vessels: 3 Vessels    Cord Complications: Nuchal   Nuchal Intervention: reduced         Nuchal cord description: loose nuchal cord         Cord Blood Disposition: Discard    Gases Sent?: No    Delayed cord clamping?: Yes    Cord Clamping Delay (seconds):  seconds           [place] Resuscitation    Methods: None        Measurements      Weight: 7 lb 14 oz Length: 1' 7.5\" "     Head circumference: 35 cm           Skin to Skin and Feeding Plan      Skin to skin initiation date/time: 1/1/1841    Skin to skin with: Mother  Skin to skin end date/time:            Labor Events and Shoulder Dystocia    Fetal Tracing Prior to Delivery: Category 1       Delivery (Maternal) (Provider to Complete) ([id number])    Episiotomy: None  Periurethral laceration: bilateral Repaired?: No     Vaginal laceration?: Yes Repaired?: Yes   Repair suture: 3-0 Vicryl  Number of repair packets: 1  Genital tract inspection done: Pos       Blood Loss  Mother: Estela Ramirez #[id number]     Start of Mother's Information      Delivery Blood Loss   Intrapartum & Postpartum: 01/23/25 1340 - 01/23/25 1631    Delivery Admission: 01/23/25 0728 - 01/23/25 1631         Intrapartum & Postpartum Delivery Admission    None                  End of Mother's Information  Mother: Estela Ramirez #[id number]                Delivery - Provider to Complete ([id number])    Delivering clinician: Farheen Robertson MD  Delivery Type (Choose the 1 that will go to the Birth History): Vaginal, Spontaneous                         Other personnel:  Provider Role   Alie Richards, RN Delivery Nurse   Marry Al RN Delivery Assist                    Placenta    Date/Time: 1/23/2025  3:51 PM  Removal: Spontaneous             Anesthesia    Method: Epidural  Cervical dilation at placement: 4-7     Analgesic:  BIRTH HISTORY: ANALGESIC   FENTANYL 2 MCG/ML - BUPIVACAINE 0.125% (0.5% SOURCE)  ML NS EPIDURAL (YELLOW CADD CASSETTE) CNR                 Presentation and Position    Presentation: Vertex     Occiput Anterior                     Farheen Robertson MD

## 2025-01-24 VITALS
RESPIRATION RATE: 16 BRPM | HEART RATE: 77 BPM | SYSTOLIC BLOOD PRESSURE: 106 MMHG | TEMPERATURE: 97.9 F | DIASTOLIC BLOOD PRESSURE: 76 MMHG | BODY MASS INDEX: 28.35 KG/M2 | OXYGEN SATURATION: 98 % | WEIGHT: 180.6 LBS | HEIGHT: 67 IN

## 2025-01-24 PROCEDURE — 99207 PR SUBSEQUENT HOSPITAL CARE FOR MOTHER, 15 MINUTES: CPT | Performed by: FAMILY MEDICINE

## 2025-01-24 PROCEDURE — 250N000013 HC RX MED GY IP 250 OP 250 PS 637: Performed by: FAMILY MEDICINE

## 2025-01-24 RX ORDER — ACETAMINOPHEN 325 MG/1
650 TABLET ORAL EVERY 4 HOURS PRN
Status: SHIPPED
Start: 2025-01-24

## 2025-01-24 RX ORDER — IBUPROFEN 800 MG/1
800 TABLET, FILM COATED ORAL EVERY 6 HOURS PRN
Status: SHIPPED
Start: 2025-01-24

## 2025-01-24 RX ORDER — DOCUSATE SODIUM 100 MG/1
100 CAPSULE, LIQUID FILLED ORAL DAILY
Status: SHIPPED
Start: 2025-01-24

## 2025-01-24 RX ADMIN — ACETAMINOPHEN 650 MG: 325 TABLET ORAL at 14:29

## 2025-01-24 RX ADMIN — MONTELUKAST 10 MG: 10 TABLET, FILM COATED ORAL at 10:07

## 2025-01-24 RX ADMIN — ACETAMINOPHEN 650 MG: 325 TABLET ORAL at 10:04

## 2025-01-24 RX ADMIN — ACETAMINOPHEN 650 MG: 325 TABLET ORAL at 06:00

## 2025-01-24 RX ADMIN — IBUPROFEN 800 MG: 800 TABLET, FILM COATED ORAL at 08:16

## 2025-01-24 RX ADMIN — IBUPROFEN 800 MG: 800 TABLET, FILM COATED ORAL at 02:00

## 2025-01-24 RX ADMIN — IBUPROFEN 800 MG: 800 TABLET, FILM COATED ORAL at 14:29

## 2025-01-24 RX ADMIN — ACETAMINOPHEN 650 MG: 325 TABLET ORAL at 02:00

## 2025-01-24 ASSESSMENT — ACTIVITIES OF DAILY LIVING (ADL)
ADLS_ACUITY_SCORE: 22

## 2025-01-24 NOTE — PLAN OF CARE
Goal Outcome Evaluation:    Problem: Postpartum (Vaginal Delivery)  Goal: Optimal Pain Control and Function  Outcome: Progressing     Problem: Postpartum (Vaginal Delivery)  Goal: Absence of Infection Signs and Symptoms  Outcome: Progressing     Vitals, fundal assessment, and lochia wdl. Patient is ambulating and voiding independently. Pain is being managed by ibuprofen and tylenol. Bonding well with infant. Breastfeeding is going well. Infant is feeding every 2-3 hours or on demand. Significant other Pradeep is at bedside.

## 2025-01-24 NOTE — DISCHARGE SUMMARY
[facility] Discharge Summary    Estela Ramirez MRN# [id number]   Age: 39 year old YOB: 1985     Date of Admission:  1/23/2025  Date of Discharge::  1/24/2025  Admitting Physician:  Farheen Robertson MD  Discharge Physician:  Manda Kwong MD     Home clinic: [facility]          Admission Diagnoses:   Encounter for supervision of high-risk pregnancy with elderly multigravida [O09.529]          Discharge Diagnosis:     Normal spontaneous vaginal delivery  Intrauterine pregnancy at 40 1/7 weeks gestation          Procedures:     Procedure(s): No additional procedures performed       No procedures performed during this admission           Medications Prior to Admission:     Medications Prior to Admission   Medication Sig Dispense Refill Last Dose/Taking    albuterol (PROAIR HFA/PROVENTIL HFA/VENTOLIN HFA) 108 (90 Base) MCG/ACT inhaler USE 2 INHALATIONS BY MOUTH  EVERY 6 HOURS AS NEEDED FOR WHEEZING 54 g 11 1/22/2025    betamethasone dipropionate (DIPROSONE) 0.05 % external cream Apply topically 2 times daily as needed. 45 g 1 More than a month    cetirizine (ZYRTEC) 10 MG tablet Take 10 mg by mouth daily.   1/22/2025    EPINEPHrine (ANY BX GENERIC EQUIV) 0.3 MG/0.3ML injection 2-pack Inject into outer thigh for allergic reaction 2 each 0 More than a month    fluticasone (FLONASE) 50 MCG/ACT nasal spray Spray 2 sprays into both nostrils daily. 16 g 5 More than a month    fluticasone-salmeterol (WIXELA INHUB) 100-50 MCG/ACT inhaler USE 1 INHALATION BY MOUTH  TWICE DAILY 180 each 3 1/23/2025 Morning    montelukast (SINGULAIR) 10 MG tablet Take 1 tablet (10 mg) by mouth at bedtime 90 tablet 3 1/23/2025 Morning    Prenatal Vit-Fe Fumarate-FA (PRENATAL MULTIVITAMIN W/IRON) 27-0.8 MG tablet Take 1 tablet by mouth daily 90 tablet 3 1/22/2025    triamcinolone (KENALOG) 0.1 % external cream Apply topically 2 times daily as needed for irritation 30 g 2 More than a month  "            Discharge Medications:   No medications were prescribed on discharge          Consultations:   No consultations were requested during this admission          Brief History of Labor:   Admitted for induction with advanced maternal age, postdates and elective.  Normal spontaneous vaginal delivery.           Hospital Course:   The patient's hospital course was unremarkable.  On discharge, her pain was well controlled. Vaginal bleeding is similar to peak menstrual flow.  Voiding without difficulty.  Ambulating well and tolerating a normal diet.  No fever.  Breastfeeding well.  Infant is stable.  No bowel movement yet.   She was discharged on post-partum day #2.    Post-partum hemoglobin:   Hemoglobin   Date Value Ref Range Status   01/23/2025 13.2 11.7 - 15.7 g/dL Final     /70 (Patient Position: Semi-Enriquez's, Cuff Size: Adult Regular)   Pulse 77   Temp 98  F (36.7  C) (Oral)   Resp 16   Ht 1.702 m (5' 7\")   Wt 81.9 kg (180 lb 9.6 oz)   LMP 04/17/2024 (Exact Date)   SpO2 98%   Breastfeeding Unknown   BMI 28.29 kg/m      General Appearance:  Healthy-appearing female, no apparent distress.                  Chest:  Lungs clear to auscultation, respirations unlabored                             Heart:  Regular rate & rhythm, no murmurs                            Abdomen:  Soft, non-tender, no masses      Uterus: firm                            Extremities:  Well-perfused, warm and dry, no calf tenderness, redness or warmth                            Neuro:  Alret and oriented x 3                                                                 Discharge Instructions and Follow-Up:     Discharge diet: Regular   Discharge activity: Activity as tolerated   Discharge follow-up: Follow up with primary care provider in 6 weeks   Wound care: Drink plenty of fluids           Discharge Disposition:     Discharged to home      Attestation:  I have reviewed today's vital signs, notes, medications, labs and " imaging.    Manda Kwong MD

## 2025-01-24 NOTE — PROGRESS NOTES
Pt stable, plan to discharge home with infant and spouse. Discharge instructions given and all questions answered. Follow up appointments discussed.

## 2025-01-24 NOTE — PROGRESS NOTES
"Outreach Note for [facility]    Chart reviewed, discharge plan discussed with patient, needs assessed. Patient verbalizes understanding of plan, requests [facility], [facility] nurse visit planned for 25, Home Care Intake updated. Patient and , \"Jennifer\", phone number is reported as correct in EMR.    Patient states she has good support at home, has baby care essentials, and feels ready to discharge today. Outreach RN will continue to follow and assist if needed with discharge plan. No additional needs identified at this time.      "

## 2025-01-26 ENCOUNTER — MEDICAL CORRESPONDENCE (OUTPATIENT)
Dept: HEALTH INFORMATION MANAGEMENT | Facility: CLINIC | Age: 40
End: 2025-01-26

## 2025-02-18 ENCOUNTER — PATIENT OUTREACH (OUTPATIENT)
Dept: CARE COORDINATION | Facility: CLINIC | Age: 40
End: 2025-02-18
Payer: COMMERCIAL

## 2025-03-04 ENCOUNTER — PATIENT OUTREACH (OUTPATIENT)
Dept: CARE COORDINATION | Facility: CLINIC | Age: 40
End: 2025-03-04
Payer: COMMERCIAL

## 2025-04-26 ENCOUNTER — HEALTH MAINTENANCE LETTER (OUTPATIENT)
Age: 40
End: 2025-04-26

## 2025-05-13 ENCOUNTER — MEDICAL CORRESPONDENCE (OUTPATIENT)
Dept: HEALTH INFORMATION MANAGEMENT | Facility: CLINIC | Age: 40
End: 2025-05-13
Payer: COMMERCIAL

## 2025-06-08 DIAGNOSIS — J45.909 UNCOMPLICATED ASTHMA, UNSPECIFIED ASTHMA SEVERITY, UNSPECIFIED WHETHER PERSISTENT: ICD-10-CM

## 2025-06-09 RX ORDER — FLUTICASONE PROPIONATE AND SALMETEROL 100; 50 UG/1; UG/1
1 POWDER RESPIRATORY (INHALATION) 2 TIMES DAILY
Qty: 180 EACH | Refills: 1 | Status: SHIPPED | OUTPATIENT
Start: 2025-06-09

## 2025-07-10 ENCOUNTER — TRANSFERRED RECORDS (OUTPATIENT)
Dept: HEALTH INFORMATION MANAGEMENT | Facility: CLINIC | Age: 40
End: 2025-07-10
Payer: COMMERCIAL

## 2025-07-13 DIAGNOSIS — J45.909 UNCOMPLICATED ASTHMA, UNSPECIFIED ASTHMA SEVERITY, UNSPECIFIED WHETHER PERSISTENT: ICD-10-CM

## 2025-07-14 RX ORDER — ALBUTEROL SULFATE 90 UG/1
INHALANT RESPIRATORY (INHALATION)
Qty: 54 G | Refills: 11 | Status: SHIPPED | OUTPATIENT
Start: 2025-07-14

## 2025-07-25 ENCOUNTER — MEDICAL CORRESPONDENCE (OUTPATIENT)
Dept: HEALTH INFORMATION MANAGEMENT | Facility: CLINIC | Age: 40
End: 2025-07-25
Payer: COMMERCIAL

## 2025-07-28 DIAGNOSIS — R09.81 NASAL CONGESTION: ICD-10-CM

## 2025-07-28 RX ORDER — MONTELUKAST SODIUM 10 MG/1
1 TABLET ORAL AT BEDTIME
Qty: 90 TABLET | Refills: 3 | Status: SHIPPED | OUTPATIENT
Start: 2025-07-28